# Patient Record
Sex: FEMALE | NOT HISPANIC OR LATINO | Employment: PART TIME | URBAN - METROPOLITAN AREA
[De-identification: names, ages, dates, MRNs, and addresses within clinical notes are randomized per-mention and may not be internally consistent; named-entity substitution may affect disease eponyms.]

---

## 2021-01-22 ENCOUNTER — OFFICE VISIT (OUTPATIENT)
Dept: URGENT CARE | Facility: CLINIC | Age: 41
End: 2021-01-22
Payer: COMMERCIAL

## 2021-01-22 VITALS
SYSTOLIC BLOOD PRESSURE: 110 MMHG | DIASTOLIC BLOOD PRESSURE: 68 MMHG | HEIGHT: 66 IN | TEMPERATURE: 98 F | HEART RATE: 66 BPM | BODY MASS INDEX: 21.73 KG/M2 | OXYGEN SATURATION: 99 % | RESPIRATION RATE: 16 BRPM | WEIGHT: 135.2 LBS

## 2021-01-22 DIAGNOSIS — H66.001 NON-RECURRENT ACUTE SUPPURATIVE OTITIS MEDIA OF RIGHT EAR WITHOUT SPONTANEOUS RUPTURE OF TYMPANIC MEMBRANE: Primary | ICD-10-CM

## 2021-01-22 PROBLEM — H66.91 OTITIS MEDIA OF RIGHT EAR: Status: ACTIVE | Noted: 2021-01-22

## 2021-01-22 PROCEDURE — 99213 OFFICE O/P EST LOW 20 MIN: CPT | Performed by: FAMILY MEDICINE

## 2021-01-22 RX ORDER — AZITHROMYCIN 250 MG/1
TABLET, FILM COATED ORAL
Qty: 6 TABLET | Refills: 0 | Status: SHIPPED | OUTPATIENT
Start: 2021-01-22 | End: 2021-01-26

## 2021-01-22 NOTE — PROGRESS NOTES
[facility]        NAME: Stephanie Morris is a 39 y o  female  : 1980    MRN: [id number]  DATE: 2021  TIME: 12:54 PM    Assessment and Plan   Non-recurrent acute suppurative otitis media of right ear without spontaneous rupture of tympanic membrane [H66 001]  1  Non-recurrent acute suppurative otitis media of right ear without spontaneous rupture of tympanic membrane  azithromycin (ZITHROMAX) 250 mg tablet         Patient Instructions       Follow up with PCP in 3-5 days  Proceed to  ER if symptoms worsen  Chief Complaint     Chief Complaint   Patient presents with    Earache     onset two weeks right ear ache  Tried allegra and flonase without relief  Monday started throbbing  History of Present Illness         51-year-old female with a 2 week history of right ear throbbing  Patient reports trying to use over-the-counter ear drops with no relief  Throbbing and achy sensation continues to persist   Denies any fevers or chills  Denies any nausea, vomiting, headaches or coughs  Review of Systems   Review of Systems   Constitutional: Negative  HENT: Positive for ear pain  Negative for ear discharge  Eyes: Negative  Respiratory: Negative  Cardiovascular: Negative  Gastrointestinal: Negative  Genitourinary: Negative  Musculoskeletal: Positive for arthralgias and myalgias  Skin: Negative  Allergic/Immunologic: Negative  Neurological: Negative  Hematological: Negative  Psychiatric/Behavioral: Negative            Current Medications       Current Outpatient Medications:     azithromycin (ZITHROMAX) 250 mg tablet, Take 2 tablets today then 1 tablet daily x 4 days, Disp: 6 tablet, Rfl: 0    Current Allergies     Allergies as of 2021    (No Known Allergies)            The following portions of the patient's history were reviewed and updated as appropriate: allergies, current medications, past family history, past medical history, past social history, past surgical history and problem list      Past Medical History:   Diagnosis Date    Scoliosis        History reviewed  No pertinent surgical history  History reviewed  No pertinent family history  Medications have been verified  Objective   /68   Pulse 66   Temp 98 °F (36 7 °C) (Temporal)   Resp 16   Ht 5' 6" (1 676 m)   Wt 61 3 kg (135 lb 3 2 oz)   SpO2 99%   BMI 21 82 kg/m²   No LMP recorded  Physical Exam     Physical Exam  Vitals signs and nursing note reviewed  Constitutional:       Appearance: She is well-developed  HENT:      Head: Normocephalic  Right Ear: Tenderness present  Tympanic membrane is injected, erythematous and bulging  Eyes:      Pupils: Pupils are equal, round, and reactive to light  Pulmonary:      Effort: Pulmonary effort is normal    Musculoskeletal: Normal range of motion  Skin:     General: Skin is warm and dry  Neurological:      Mental Status: She is alert and oriented to person, place, and time

## 2023-05-02 ENCOUNTER — OFFICE VISIT (OUTPATIENT)
Dept: DERMATOLOGY | Facility: CLINIC | Age: 43
End: 2023-05-02

## 2023-05-02 VITALS — TEMPERATURE: 97.6 F | BODY MASS INDEX: 21.69 KG/M2 | WEIGHT: 135 LBS | HEIGHT: 66 IN

## 2023-05-02 DIAGNOSIS — L71.0 PERIORIFICIAL DERMATITIS: Primary | ICD-10-CM

## 2023-05-02 DIAGNOSIS — L71.9 ROSACEA: ICD-10-CM

## 2023-05-02 RX ORDER — DOXYCYCLINE HYCLATE 20 MG
20 TABLET ORAL 2 TIMES DAILY
Qty: 60 TABLET | Refills: 0 | Status: SHIPPED | OUTPATIENT
Start: 2023-05-02 | End: 2023-06-01

## 2023-05-02 RX ORDER — METRONIDAZOLE 10 MG/G
GEL TOPICAL DAILY
Qty: 45 G | Refills: 6 | Status: SHIPPED | OUTPATIENT
Start: 2023-05-02

## 2023-05-02 NOTE — PATIENT INSTRUCTIONS
1  PERIORIFICIAL DERMATITIS/ ROSECEA      Assessment and Plan:  Based on a thorough discussion of this condition and the management approach to it (including a comprehensive discussion of the known risks, side effects and potential benefits of treatment), the patient (family) agrees to implement the following specific plan:  Begin Doxycycline 20 mg twice a day with food and water  Begin Metrogel daily    What is periorificial dermatitis? Periorificial dermatitis is a common facial skin problem characterized by groups of itchy or tender small red bumps  It is given this name because the bumps occur around the eyes, the nostrils, the mouth and occasionally, the genitals  The more restrictive term, perioral dermatitis, is often used when the eruption is confined to the skin in the lower half of the face, particularly around the mouth  Periocular dermatitis may be used to describe the rash affecting the eyelids  Periorificial dermatitis mainly affects adult women aged 13 to 39 years  It is less common in men  It can also affect children of any age  What is the cause of periorificial or periorificial dermatitis? The exact cause of periorificial is not understood  Periorificial dermatitis may be related to:  Skin barrier dysfunction  Activation of the body's immune system  Altered bacterial levels on the skin  Bacteria from hair follicles    Unlike seborrheic dermatitis which can affect similar areas of the face, malassezia yeasts are not involved in periorificial dermatitis  Periorificial dermatitis may be directly caused by: Topical steroids, whether applied deliberately to facial skin or inadvertently  Nasal steroids, steroid inhalers, and oral steroids  Cosmetic creams, make-ups and sunscreens  Fluorinated toothpaste  Neglecting to wash the face  Hormonal changes and/or oral contraceptives    What are the symptoms of periorificial dermatitis?   The characteristics of facial periorificial dermatitis are:  Eruption on the chin, upper lip and eyelids   Sparing of the skin bordering the lips (which then appears pale), eyelids, nostrils  Clusters of 1-2 mm red bumps, potentially with pus  Dry and flaky skin surface  Burning irritation    In contrast to steroid-induced rosacea, periorificial dermatitis spares the cheeks and forehead  Genital periorificial dermatitis has a similar clinical appearance  It involves the skin on and around labia majora (in females), scrotum (in males) and anus  Granulomatous periorificial dermatitis is a variant of periorificial dermatitis that presents with persistent yellowish bumps  It occurs mainly in young children and nearly always follows the use of a corticosteroid  Rebound flare of severe periorificial dermatitis may occur after abrupt cessation of application of potent topical steroid to facial skin  The presentation of periorificial dermatitis is usually typical, so diagnosis can be made by history and skin exam  There are no specific tests  Skin biopsy may occasionally be taken, and show similar findings to rosacea  Periorificial dermatitis responds well to treatment, although it may take several weeks before there is a noticeable improvement  General measures  Discontinue applying all face creams including topical steroids, cosmetics and sunscreens (zero therapy)  Consider a slower withdrawal from topical steroid/face creams if there is a severe flare after steroid cessation  Temporarily, replace it by a less potent or less occlusive cream or apply it less and less frequently until it is no longer required  Wash the face with warm water alone while the rash is present  When it has cleared up, use a non-soap bar or liquid cleanser if you wish  Choose a liquid or gel sunscreen  Topical therapy: used to treat mild periorificial dermatitis   Choices include:  Erythromycin  Clindamycin  Metronidazole  Pimecrolimus  Azelaic acid    Oral therapy: in more severe cases, a course of oral antibiotics may be prescribed for 6-12 weeks  Most often, a tetracycline such as doxycycline is recommended  A sub-antimicrobial dose may be sufficient  Oral erythromycin is used during pregnancy and in pre-pubertal children  Oral low-dose isotretinoin may be used if antibiotics are ineffective or contraindicated  Periorificial dermatitis can generally be prevented by the avoidance of topical steroids and occlusive face creams  When topical steroids are necessary to treat an inflammatory facial rash, they should be applied accurately to the affected area, no more than once daily in the lowest effective potency, and discontinued as soon as the rash responds  Periorificial dermatitis sometimes recurs when the antibiotics are discontinued, or at a later date  The same treatment can be used again

## 2023-05-02 NOTE — PROGRESS NOTES
"[facility] Note     Patient Name: Sunny Regalado  Encounter Date: 5/2/2023     Have you been cared for by a [facility] in the last 3 years and, if so, which description applies to you? NO  I am considered a \"new\" patient and must complete all patient intake questions  I am FEMALE/of child-bearing potential     REVIEW OF SYSTEMS:  Have you recently had or currently have any of the following? · Recent fever or chills? No  · Any non-healing wound? No  · Are you pregnant or planning to become pregnant? No  · Are you currently or planning to be nursing or breast feeding? No   PAST MEDICAL HISTORY:  Have you personally ever had or currently have any of the following? If \"YES,\" then please provide more detail  · Skin cancer (such as Melanoma, Basal Cell Carcinoma, Squamous Cell Carcinoma? No  · Tuberculosis, HIV/AIDS, Hepatitis B or C: No  · Systemic Immunosuppression such as Diabetes, Biologic or Immunotherapy, Chemotherapy, Organ Transplantation, Bone Marrow Transplantation No  · Radiation Treatment No   FAMILY HISTORY:  Any \"first degree relatives\" (parent, brother, sister, or child) with the following?  Skin Cancer, Pancreatic or Other Cancer? No   PATIENT EXPERIENCE:     Do you want the Dermatologist to perform a COMPLETE skin exam today including a clinical examination under the \"bra and underwear\" areas? NO   If necessary, do we have your permission to call and leave a detailed message on your Preferred Phone number that includes your specific medical information? Yes      No Known Allergies No current outpatient medications on file   Whom besides the patient is providing clinical information about today's encounter?   o NO ADDITIONAL HISTORIAN (patient alone provided history)    Physical Exam and Assessment/Plan by Diagnosis:    1   PERIORIFICIAL DERMATITIS/ ROSECEA    Physical Exam:   Anatomic Location Affected: Central face     Morphological " Description:  Pink papules      Additional History of Present Condition:  Presents for over 1 month  Reports itch and painful  Assessment and Plan:  Based on a thorough discussion of this condition and the management approach to it (including a comprehensive discussion of the known risks, side effects and potential benefits of treatment), the patient (family) agrees to implement the following specific plan:  · Begin Doxycycline 20 mg twice a day with food and water  · Begin Metrogel daily    What is periorificial dermatitis? Periorificial dermatitis is a common facial skin problem characterized by groups of itchy or tender small red bumps  It is given this name because the bumps occur around the eyes, the nostrils, the mouth and occasionally, the genitals  The more restrictive term, perioral dermatitis, is often used when the eruption is confined to the skin in the lower half of the face, particularly around the mouth  Periocular dermatitis may be used to describe the rash affecting the eyelids  Periorificial dermatitis mainly affects adult women aged 13 to 39 years  It is less common in men  It can also affect children of any age  What is the cause of periorificial or periorificial dermatitis? The exact cause of periorificial is not understood  Periorificial dermatitis may be related to:   Skin barrier dysfunction   Activation of the body's immune system   Altered bacterial levels on the skin   Bacteria from hair follicles    Unlike seborrheic dermatitis which can affect similar areas of the face, malassezia yeasts are not involved in periorificial dermatitis    Periorificial dermatitis may be directly caused by:   Topical steroids, whether applied deliberately to facial skin or inadvertently   Nasal steroids, steroid inhalers, and oral steroids   Cosmetic creams, make-ups and sunscreens   Fluorinated toothpaste   Neglecting to wash the face   Hormonal changes and/or oral contraceptives    What are the symptoms of periorificial dermatitis? The characteristics of facial periorificial dermatitis are:   Eruption on the chin, upper lip and eyelids    Sparing of the skin bordering the lips (which then appears pale), eyelids, nostrils   Clusters of 1-2 mm red bumps, potentially with pus   Dry and flaky skin surface   Burning irritation    In contrast to steroid-induced rosacea, periorificial dermatitis spares the cheeks and forehead  Genital periorificial dermatitis has a similar clinical appearance  It involves the skin on and around labia majora (in females), scrotum (in males) and anus  Granulomatous periorificial dermatitis is a variant of periorificial dermatitis that presents with persistent yellowish bumps  It occurs mainly in young children and nearly always follows the use of a corticosteroid  Rebound flare of severe periorificial dermatitis may occur after abrupt cessation of application of potent topical steroid to facial skin  The presentation of periorificial dermatitis is usually typical, so diagnosis can be made by history and skin exam  There are no specific tests  Skin biopsy may occasionally be taken, and show similar findings to rosacea  Periorificial dermatitis responds well to treatment, although it may take several weeks before there is a noticeable improvement  General measures   Discontinue applying all face creams including topical steroids, cosmetics and sunscreens (zero therapy)   Consider a slower withdrawal from topical steroid/face creams if there is a severe flare after steroid cessation  Temporarily, replace it by a less potent or less occlusive cream or apply it less and less frequently until it is no longer required   Wash the face with warm water alone while the rash is present  When it has cleared up, use a non-soap bar or liquid cleanser if you wish   Choose a liquid or gel sunscreen  Topical therapy: used to treat mild periorificial dermatitis  Choices include:   Erythromycin   Clindamycin   Metronidazole   Pimecrolimus   Azelaic acid    Oral therapy: in more severe cases, a course of oral antibiotics may be prescribed for 6-12 weeks   Most often, a tetracycline such as doxycycline is recommended  A sub-antimicrobial dose may be sufficient   Oral erythromycin is used during pregnancy and in pre-pubertal children   Oral low-dose isotretinoin may be used if antibiotics are ineffective or contraindicated  Periorificial dermatitis can generally be prevented by the avoidance of topical steroids and occlusive face creams  When topical steroids are necessary to treat an inflammatory facial rash, they should be applied accurately to the affected area, no more than once daily in the lowest effective potency, and discontinued as soon as the rash responds  Periorificial dermatitis sometimes recurs when the antibiotics are discontinued, or at a later date  The same treatment can be used again        Scribe Attestation    I,:  Cynyd Baxter am acting as a scribe while in the presence of the attending physician :       I,:  Nyasia Manjarrez MD personally performed the services described in this documentation    as scribed in my presence :

## 2023-07-26 ENCOUNTER — OFFICE VISIT (OUTPATIENT)
Dept: DERMATOLOGY | Facility: CLINIC | Age: 43
End: 2023-07-26
Payer: COMMERCIAL

## 2023-07-26 VITALS — HEIGHT: 66 IN | BODY MASS INDEX: 20.25 KG/M2 | WEIGHT: 126 LBS

## 2023-07-26 DIAGNOSIS — D18.01 CHERRY ANGIOMA: ICD-10-CM

## 2023-07-26 DIAGNOSIS — E75.29 LEUKODYSTROPHY (HCC): ICD-10-CM

## 2023-07-26 DIAGNOSIS — D22.5 MULTIPLE BENIGN NEVI OF UPPER EXTREMITY, LOWER EXTREMITY, AND TRUNK: Primary | ICD-10-CM

## 2023-07-26 DIAGNOSIS — D48.5 NEOPLASM OF UNCERTAIN BEHAVIOR OF SKIN: ICD-10-CM

## 2023-07-26 DIAGNOSIS — D22.70 MULTIPLE BENIGN NEVI OF UPPER EXTREMITY, LOWER EXTREMITY, AND TRUNK: Primary | ICD-10-CM

## 2023-07-26 DIAGNOSIS — D22.60 MULTIPLE BENIGN NEVI OF UPPER EXTREMITY, LOWER EXTREMITY, AND TRUNK: Primary | ICD-10-CM

## 2023-07-26 PROCEDURE — 99213 OFFICE O/P EST LOW 20 MIN: CPT | Performed by: DERMATOLOGY

## 2023-07-26 NOTE — PROGRESS NOTES
[facility] Note     Patient Name: Haseeb Carrillo  Encounter Date: 7/26/2023     Have you been cared for by a [facility] in the last 3 years and, if so, which description applies to you? Yes. I have been here within the last 3 years, and my medical history has NOT changed since that time. I am FEMALE/of child-bearing potential.    REVIEW OF SYSTEMS:  Have you recently had or currently have any of the following? No changes in my recent health. PAST MEDICAL HISTORY:  Have you personally ever had or currently have any of the following? If "YES," then please provide more detail. No changes in my medical history. FAMILY HISTORY:  Any "first degree relatives" (parent, brother, sister, or child) with the following? No changes in my family's known health. PATIENT EXPERIENCE:    Do you want the Dermatologist to perform a COMPLETE skin exam today including a clinical examination under the "bra and underwear" areas? Yes  If necessary, do we have your permission to call and leave a detailed message on your Preferred Phone number that includes your specific medical information? Yes      No Known Allergies   Current Outpatient Medications:   •  metroNIDAZOLE (METROGEL) 1 % gel, Apply topically daily (Patient not taking: Reported on 7/26/2023), Disp: 45 g, Rfl: 6          Whom besides the patient is providing clinical information about today's encounter? NO ADDITIONAL HISTORIAN (patient alone provided history)    Physical Exam and Assessment/Plan by Diagnosis:    1. MELANOCYTIC NEVI ("Moles")    Physical Exam:  Anatomic Location Affected:   Mostly on sun-exposed areas of the trunk and extremities  Morphological Description:  Scattered, 1-4mm round to ovoid, symmetrical-appearing, even bordered, skin colored to dark brown macules/papules, mostly in sun-exposed areas    Assessment and Plan:  Based on a thorough discussion of this condition and the management approach to it (including a comprehensive discussion of the known risks, side effects and potential benefits of treatment), the patient (family) agrees to implement the following specific plan:  When outside we recommend using a wide brim hat, sunglasses, long sleeve and pants, sunscreen with SPF 07+ with reapplication every 2 hours, or SPF specific clothing   Benign, reassured  Annual skin check     Melanocytic Nevi  Melanocytic nevi ("moles") are tan or brown, raised or flat areas of the skin which have an increased number of melanocytes. Melanocytes are the cells in our body which make pigment and account for skin color. Some moles are present at birth (I.e., "congenital nevi"), while others come up later in life (i.e., "acquired nevi"). The sun can stimulate the body to make more moles. Sunburns are not the only thing that triggers more moles. Chronic sun exposure can do it too. Clinically distinguishing a healthy mole from melanoma may be difficult, even for experienced dermatologists. The "ABCDE's" of moles have been suggested as a means of helping to alert a person to a suspicious mole and the possible increased risk of melanoma. The suggestions for raising alert are as follows:    Asymmetry: Healthy moles tend to be symmetric, while melanomas are often asymmetric. Asymmetry means if you draw a line through the mole, the two halves do not match in color, size, shape, or surface texture. Asymmetry can be a result of rapid enlargement of a mole, the development of a raised area on a previously flat lesion, scaling, ulceration, bleeding or scabbing within the mole. Any mole that starts to demonstrate "asymmetry" should be examined promptly by a board certified dermatologist.     Border: Healthy moles tend to have discrete, even borders. The border of a melanoma often blends into the normal skin and does not sharply delineate the mole from normal skin.   Any mole that starts to demonstrate "uneven borders" should be examined promptly by a board certified dermatologist.     Color: Healthy moles tend to be one color throughout. Melanomas tend to be made up of different colors ranging from dark black, blue, white, or red. Any mole that demonstrates a color change should be examined promptly by a board certified dermatologist.     Diameter: Healthy moles tend to be smaller than 0.6 cm in size; an exception are "congenital nevi" that can be larger. Melanomas tend to grow and can often be greater than 0.6 cm (1/4 of an inch, or the size of a pencil eraser). This is only a guideline, and many normal moles may be larger than 0.6 cm without being unhealthy. Any mole that starts to change in size (small to bigger or bigger to smaller) should be examined promptly by a board certified dermatologist.     Evolving: Healthy moles tend to "stay the same."  Melanomas may often show signs of change or evolution such as a change in size, shape, color, or elevation. Any mole that starts to itch, bleed, crust, burn, hurt, or ulcerate or demonstrate a change or evolution should be examined promptly by a board certified dermatologist.      Mary Phillips    Physical Exam:  Anatomic Location Affected:  trunk  Morphological Description:  Scattered cherry red, 1-4 mm papules. Assessment and Plan:  Based on a thorough discussion of this condition and the management approach to it (including a comprehensive discussion of the known risks, side effects and potential benefits of treatment), the patient (family) agrees to implement the following specific plan:  Monitor for changes  Benign, reassured      Assessment and Plan:    Cherry angioma, also known as Pauline Boer spots, are benign vascular skin lesions. A "cherry angioma" is a firm red, blue or purple papule, 0.1-1 cm in diameter. When thrombosed, they can appear black in colour until evaluated with a dermatoscope when the red or purple colour is more easily seen.  Cherry angioma may develop on any part of the body but most often appear on the scalp, face, lips and trunk. An angioma is due to proliferating endothelial cells; these are the cells that line the inside of a blood vessel. Angiomas can arise in early life or later in life; the most common type of angioma is a cherry angioma. Cherry angiomas are very common in males and females of any age or race. They are more noticeable in white skin than in skin of colour. They markedly increase in number from about the age of 36. There may be a family history of similar lesions. Eruptive cherry angiomas have been rarely reported to be associated with internal malignancy. The cause of angiomas is unknown. Genetic analysis of cherry angiomas has shown that they frequently carry specific somatic missense mutations in the GNAQ and GNA11 (Q209H) genes, which are involved in other vascular and melanocytic proliferations. 3. NEOPLASM OF UNCERTAIN BEHAVIOR OF SKIN    Physical Exam:  (Anatomic Location); (Size and Morphological Description); (Differential Diagnosis):  Specimen A; Left webspace between 4th and 5th toes; 0.3 cm angulated pigmented macule; Differential diagnosis: Nevus rule out atypia      Additional History of Present Condition:  Present for 2 years. Patient has history of RSD after foot surgery 2001. Assessment and Plan:  I have discussed with the patient that a sample of skin via a "skin biopsy” would be potentially helpful to further make a specific diagnosis under the microscope. Based on a thorough discussion of this condition and the management approach to it (including a comprehensive discussion of the known risks, side effects and potential benefits of treatment), the patient (family) agrees to implement the following specific plan:    Scheduled excision 10/17/2023 at 3:10 with Dr. Kaylyn Grey in [facility]  Note patient has history of RSD.  She will discuss with RSD provider and proceed with cautious treatment as atraumatically as possible. 4. LEUKODYSTROPHY  Secondary to nail pollish    Physical Exam:  Anatomic Location Affected: All toenails  Morphological Description:  White discoloration of distal half    Additional History of Present Condition:  Present for within last year.     Assessment and Plan:  Based on a thorough discussion of this condition and the management approach to it (including a comprehensive discussion of the known risks, side effects and potential benefits of treatment), the patient (family) agrees to implement the following specific plan:  Recommend keeping nail polish off and nails should grow out normal  Recommend using antifungal cream daily    Scribe Attestation    I,:  Pankaj Cazares MA am acting as a scribe while in the presence of the attending physician.:       I,:  Leonie Hillman MD personally performed the services described in this documentation    as scribed in my presence.:

## 2023-07-26 NOTE — PATIENT INSTRUCTIONS
1.MELANOCYTIC NEVI ("Moles")    Physical Exam:  Anatomic Location Affected:   Mostly on sun-exposed areas of the trunk and extremities  Morphological Description:  Scattered, 1-4mm round to ovoid, symmetrical-appearing, even bordered, skin colored to dark brown macules/papules, mostly in sun-exposed areas    Assessment and Plan:  Based on a thorough discussion of this condition and the management approach to it (including a comprehensive discussion of the known risks, side effects and potential benefits of treatment), the patient (family) agrees to implement the following specific plan:  When outside we recommend using a wide brim hat, sunglasses, long sleeve and pants, sunscreen with SPF 34+ with reapplication every 2 hours, or SPF specific clothing   Benign, reassured  Annual skin check     Melanocytic Nevi  Melanocytic nevi ("moles") are tan or brown, raised or flat areas of the skin which have an increased number of melanocytes. Melanocytes are the cells in our body which make pigment and account for skin color. Some moles are present at birth (I.e., "congenital nevi"), while others come up later in life (i.e., "acquired nevi"). The sun can stimulate the body to make more moles. Sunburns are not the only thing that triggers more moles. Chronic sun exposure can do it too. Clinically distinguishing a healthy mole from melanoma may be difficult, even for experienced dermatologists. The "ABCDE's" of moles have been suggested as a means of helping to alert a person to a suspicious mole and the possible increased risk of melanoma. The suggestions for raising alert are as follows:    Asymmetry: Healthy moles tend to be symmetric, while melanomas are often asymmetric. Asymmetry means if you draw a line through the mole, the two halves do not match in color, size, shape, or surface texture.  Asymmetry can be a result of rapid enlargement of a mole, the development of a raised area on a previously flat lesion, scaling, ulceration, bleeding or scabbing within the mole. Any mole that starts to demonstrate "asymmetry" should be examined promptly by a board certified dermatologist.     Border: Healthy moles tend to have discrete, even borders. The border of a melanoma often blends into the normal skin and does not sharply delineate the mole from normal skin. Any mole that starts to demonstrate "uneven borders" should be examined promptly by a board certified dermatologist.     Color: Healthy moles tend to be one color throughout. Melanomas tend to be made up of different colors ranging from dark black, blue, white, or red. Any mole that demonstrates a color change should be examined promptly by a board certified dermatologist.     Diameter: Healthy moles tend to be smaller than 0.6 cm in size; an exception are "congenital nevi" that can be larger. Melanomas tend to grow and can often be greater than 0.6 cm (1/4 of an inch, or the size of a pencil eraser). This is only a guideline, and many normal moles may be larger than 0.6 cm without being unhealthy. Any mole that starts to change in size (small to bigger or bigger to smaller) should be examined promptly by a board certified dermatologist.     Evolving: Healthy moles tend to "stay the same."  Melanomas may often show signs of change or evolution such as a change in size, shape, color, or elevation. Any mole that starts to itch, bleed, crust, burn, hurt, or ulcerate or demonstrate a change or evolution should be examined promptly by a board certified dermatologist.      Karli Penn    Physical Exam:  Anatomic Location Affected:  trunk  Morphological Description:  Scattered cherry red, 1-4 mm papules.     Assessment and Plan:  Based on a thorough discussion of this condition and the management approach to it (including a comprehensive discussion of the known risks, side effects and potential benefits of treatment), the patient (family) agrees to implement the following specific plan:  Monitor for changes  Benign, reassured      Assessment and Plan:    Cherry angioma, also known as Evonnie Bears spots, are benign vascular skin lesions. A "cherry angioma" is a firm red, blue or purple papule, 0.1-1 cm in diameter. When thrombosed, they can appear black in colour until evaluated with a dermatoscope when the red or purple colour is more easily seen. Cherry angioma may develop on any part of the body but most often appear on the scalp, face, lips and trunk. An angioma is due to proliferating endothelial cells; these are the cells that line the inside of a blood vessel. Angiomas can arise in early life or later in life; the most common type of angioma is a cherry angioma. Cherry angiomas are very common in males and females of any age or race. They are more noticeable in white skin than in skin of colour. They markedly increase in number from about the age of 36. There may be a family history of similar lesions. Eruptive cherry angiomas have been rarely reported to be associated with internal malignancy. The cause of angiomas is unknown. Genetic analysis of cherry angiomas has shown that they frequently carry specific somatic missense mutations in the GNAQ and GNA11 (Q209H) genes, which are involved in other vascular and melanocytic proliferations. 3. NEOPLASM OF UNCERTAIN BEHAVIOR OF SKIN    Physical Exam:  (Anatomic Location); (Size and Morphological Description); (Differential Diagnosis):  Specimen A; Left webspace between 4th and 5th toes; 0.3 cm angulated pigmented macule; Differential diagnosis: Nevus rule out atypia    Assessment and Plan:  I have discussed with the patient that a sample of skin via a "skin biopsy” would be potentially helpful to further make a specific diagnosis under the microscope.   Based on a thorough discussion of this condition and the management approach to it (including a comprehensive discussion of the known risks, side effects and potential benefits of treatment), the patient (family) agrees to implement the following specific plan:    Scheduled excision 10/17/2023 at 3:10 with Dr. Haven Salas in [facility]    4. LEUKODYSTROPHY    Physical Exam:  Anatomic Location Affected:   All toenails      Assessment and Plan:  Based on a thorough discussion of this condition and the management approach to it (including a comprehensive discussion of the known risks, side effects and potential benefits of treatment), the patient (family) agrees to implement the following specific plan:  Recommend keeping nail polish off and nails should grow out normal  Recommend using antifungal cream daily

## 2023-09-27 ENCOUNTER — OFFICE VISIT (OUTPATIENT)
Dept: GYNECOLOGY | Facility: CLINIC | Age: 43
End: 2023-09-27
Payer: COMMERCIAL

## 2023-09-27 VITALS
WEIGHT: 127.8 LBS | SYSTOLIC BLOOD PRESSURE: 104 MMHG | BODY MASS INDEX: 21.29 KG/M2 | DIASTOLIC BLOOD PRESSURE: 70 MMHG | HEIGHT: 65 IN

## 2023-09-27 DIAGNOSIS — Z12.31 ENCOUNTER FOR SCREENING MAMMOGRAM FOR BREAST CANCER: ICD-10-CM

## 2023-09-27 DIAGNOSIS — N92.6 IRREGULAR MENSES: ICD-10-CM

## 2023-09-27 DIAGNOSIS — N60.11 FIBROCYSTIC BREAST CHANGES, BILATERAL: ICD-10-CM

## 2023-09-27 DIAGNOSIS — Z11.51 SCREENING FOR HUMAN PAPILLOMAVIRUS (HPV): ICD-10-CM

## 2023-09-27 DIAGNOSIS — N64.4 BREAST PAIN, RIGHT: ICD-10-CM

## 2023-09-27 DIAGNOSIS — Z01.419 WOMEN'S ANNUAL ROUTINE GYNECOLOGICAL EXAMINATION: Primary | ICD-10-CM

## 2023-09-27 DIAGNOSIS — N60.12 FIBROCYSTIC BREAST CHANGES, BILATERAL: ICD-10-CM

## 2023-09-27 PROBLEM — Q51.28 UTERINE SEPTUM: Status: ACTIVE | Noted: 2023-09-27

## 2023-09-27 PROCEDURE — G0476 HPV COMBO ASSAY CA SCREEN: HCPCS | Performed by: OBSTETRICS & GYNECOLOGY

## 2023-09-27 PROCEDURE — G0145 SCR C/V CYTO,THINLAYER,RESCR: HCPCS | Performed by: OBSTETRICS & GYNECOLOGY

## 2023-09-27 PROCEDURE — S0610 ANNUAL GYNECOLOGICAL EXAMINA: HCPCS | Performed by: OBSTETRICS & GYNECOLOGY

## 2023-09-27 NOTE — PROGRESS NOTES
Assessment/Plan   Diagnoses and all orders for this visit:    Women's annual routine gynecological examination    Encounter for screening mammogram for breast cancer  -     Cancel: Mammo screening bilateral w 3d & cad; Future    Irregular menses  -     hCG, quantitative; Future  -     CBC; Future  -     TSH, 3rd generation; Future    Breast pain, right  -     Mammo diagnostic bilateral w 3d & cad; Future  -     US breast right limited (diagnostic); Future    Fibrocystic breast changes, bilateral    1. new patient yearly exam-Pap smear done with HPV testing, self breast awareness reviewed, calcium/vitamin D recommendations discussed, mammogram request given,  2. right breast discomfort-this has been present for a number of years. Did see some documentation in the EMR in 2018 with mastodynia. Did have imaging at [facility] couple years ago with no specific findings by her report. Exam is notable for fibrocystic changes noted bilaterally, particularly in the lower quadrants. Her tenderness is noted in the lower inner quadrant of the right breast and the lower midline quadrant under the nipple. No focal masses or warmth or erythema or discharge are appreciated. She does use sports bras now, avoid underwire bra. Suggested ibuprofen or Tylenol as needed. Did discuss evening primrose oil. Given the persistence of symptoms and her significant discomfort anytime anything touches the breast, we will proceed with evaluation. Bilateral diagnostic mammogram ordered along with right breast ultrasound. Follow-up with breast check at follow-up visit. Could consider referral to breast specialist if symptoms persist.  3.  Irregular bleeding- typically menses are 24 to 30-day intervals lasting 4 to 5 days, bleeding stops for 4 to 5 days and then again for 1 to 2 days time. Over the last 6+ weeks, she has been getting bleeding every 2 weeks which is new for her. She denies pregnancy. Exam is unremarkable.   Would recommend evaluation. Laboratory sheet given for CBC, TSH, and hCG. To return near future for sonohysterogram with endometrial biopsy. Counseled in detail about the procedure, encouraged to take ibuprofen prior to it. 4. history of uterine septum-status post resection prior to her children. We will assess at sonohysterogram.  5. history uterine fibroid-was noted during fertility treatments. She was told it was small, not affecting the endometrium. We will assess at ultrasound/sonohysterogram.  6.  History of ovarian cyst-noted during fertility treatment. She denies any recent symptoms. To assess at ultrasound. 7. history of  delivery x2-first pregnancy conceived with IUI, rupture membranes 30 weeks,  delivery at 32 weeks via . Second pregnancy conceived with holistic doctor,  findings noted at 21 weeks treated with vaginal progesterone ultimately delivered with repeat  at 42 weeks. 8. other-has multiple allergies. Takes betaine for decrease acid in the stomach with much improvement, also allergy shots    Subjective   Patient ID: Sherri Aquino is a 37 y.o. female. Vitals:    23 1402   BP: 104/70     Patient was seen today for new patient yearly exam.  Please see assessment plan for details.       The following portions of the patient's history were reviewed and updated as appropriate: allergies, current medications, past family history, past medical history, past social history, past surgical history and problem list.  Past Medical History:   Diagnosis Date   • Scoliosis      Past Surgical History:   Procedure Laterality Date   •  SECTION     • FOOT NEUROMA SURGERY Left      OB History    Para Term  AB Living   3 2   2   2   SAB IAB Ectopic Multiple Live Births                  # Outcome Date GA Lbr Ernesto/2nd Weight Sex Delivery Anes PTL Lv   3             2             1                 Current Outpatient Medications:   •  metroNIDAZOLE (METROGEL) 1 % gel, Apply topically daily (Patient not taking: Reported on 7/26/2023), Disp: 45 g, Rfl: 6  Allergies   Allergen Reactions   • Seasonal Ic [Octacosanol] Fatigue     Social History     Socioeconomic History   • Marital status: /Civil Union     Spouse name: None   • Number of children: None   • Years of education: None   • Highest education level: None   Occupational History   • None   Tobacco Use   • Smoking status: Former   • Smokeless tobacco: Never   Vaping Use   • Vaping Use: Never used   Substance and Sexual Activity   • Alcohol use: Yes     Comment: social   • Drug use: Never   • Sexual activity: Yes     Partners: Male   Other Topics Concern   • None   Social History Narrative   • None     Social Determinants of Health     Financial Resource Strain: Not on file   Food Insecurity: Not on file   Transportation Needs: Not on file   Physical Activity: Not on file   Stress: Not on file   Social Connections: Not on file   Intimate Partner Violence: Not on file   Housing Stability: Not on file     Family History   Problem Relation Age of Onset   • Stroke Mother    • Hypertension Mother    • Heart disease Mother    • Hyperlipidemia Father    • Hypertension Father    • No Known Problems Sister    • No Known Problems Brother    • Cancer Maternal Grandfather    • Other Paternal Grandmother    • Ovarian cancer Paternal Aunt        Review of Systems   Constitutional: Negative for chills, diaphoresis, fatigue and fever. Respiratory: Negative for apnea, cough, chest tightness, shortness of breath and wheezing. Cardiovascular: Negative for chest pain, palpitations and leg swelling. Gastrointestinal: Negative for abdominal distention, abdominal pain, anal bleeding, constipation, diarrhea, nausea, rectal pain and vomiting.    Genitourinary: Negative for difficulty urinating, dyspareunia, dysuria, frequency, hematuria, menstrual problem, pelvic pain, urgency, vaginal bleeding, vaginal discharge and vaginal pain. Musculoskeletal: Negative for arthralgias, back pain and myalgias. Skin: Negative for color change and rash. Neurological: Negative for dizziness, syncope, light-headedness, numbness and headaches. Hematological: Negative for adenopathy. Does not bruise/bleed easily. Psychiatric/Behavioral: Negative for dysphoric mood and sleep disturbance. The patient is not nervous/anxious. Objective   Physical Exam  OBGyn Exam     Objective      /70 (BP Location: Right arm, Patient Position: Sitting)   Ht 5' 5" (1.651 m)   Wt 58 kg (127 lb 12.8 oz)   LMP 09/22/2023   BMI 21.27 kg/m²     General:   alert and oriented, in no acute distress   Neck: normal to inspection and palpation   Breast: normal appearance, no masses or tenderness, tenderness noted in the lower inner quadrant of the right breast and inferior to the nipple at 6:00. Fibrocystic changes noted, symmetric bilaterally without any dominant masses or erythema or warmth or tenderness or discharge   Heart:    Lungs:    Abdomen: soft, non-tender, without masses or organomegaly   Vulva: normal   Vagina: Without erythema or lesions or discharge. Normal   Cervix: Without lesions or discharge or cervicitis.   No Cervical motion tenderness   Uterus: top normal size, anteverted, non-tender   Adnexa: no mass, fullness, tenderness   Rectum: negative    Psych:  Normal mood and affect   Skin:  Without obvious lesions   Eyes: symmetric, with normal movements and reactivity   Musculoskeletal:  Normal muscle tone and movements appreciated

## 2023-09-28 LAB
HPV HR 12 DNA CVX QL NAA+PROBE: NEGATIVE
HPV16 DNA CVX QL NAA+PROBE: NEGATIVE
HPV18 DNA CVX QL NAA+PROBE: NEGATIVE

## 2023-09-29 ENCOUNTER — LAB (OUTPATIENT)
Dept: LAB | Facility: HOSPITAL | Age: 43
End: 2023-09-29
Payer: COMMERCIAL

## 2023-09-29 DIAGNOSIS — N92.6 IRREGULAR MENSES: ICD-10-CM

## 2023-09-29 LAB
B-HCG SERPL-ACNC: <1 MIU/ML (ref 0–5)
ERYTHROCYTE [DISTWIDTH] IN BLOOD BY AUTOMATED COUNT: 12.7 % (ref 11.6–15.1)
HCT VFR BLD AUTO: 42.1 % (ref 34.8–46.1)
HGB BLD-MCNC: 14 G/DL (ref 11.5–15.4)
MCH RBC QN AUTO: 30.9 PG (ref 26.8–34.3)
MCHC RBC AUTO-ENTMCNC: 33.3 G/DL (ref 31.4–37.4)
MCV RBC AUTO: 93 FL (ref 82–98)
PLATELET # BLD AUTO: 237 THOUSANDS/UL (ref 149–390)
PMV BLD AUTO: 10.9 FL (ref 8.9–12.7)
RBC # BLD AUTO: 4.53 MILLION/UL (ref 3.81–5.12)
TSH SERPL DL<=0.05 MIU/L-ACNC: 1.49 UIU/ML (ref 0.45–4.5)
WBC # BLD AUTO: 5.49 THOUSAND/UL (ref 4.31–10.16)

## 2023-09-29 PROCEDURE — 85027 COMPLETE CBC AUTOMATED: CPT

## 2023-09-29 PROCEDURE — 36415 COLL VENOUS BLD VENIPUNCTURE: CPT

## 2023-09-29 PROCEDURE — 84443 ASSAY THYROID STIM HORMONE: CPT

## 2023-09-29 PROCEDURE — 84702 CHORIONIC GONADOTROPIN TEST: CPT

## 2023-09-29 NOTE — RESULT ENCOUNTER NOTE
TSH, CBC, hCG are all normal/negative. Patient to follow-up near future for sonohysterogram with endometrial biopsy or as needed.

## 2023-10-04 LAB
LAB AP GYN PRIMARY INTERPRETATION: NORMAL
LAB AP LMP: NORMAL
Lab: NORMAL

## 2023-10-16 ENCOUNTER — HOSPITAL ENCOUNTER (OUTPATIENT)
Dept: ULTRASOUND IMAGING | Facility: CLINIC | Age: 43
Discharge: HOME | End: 2023-10-16
Payer: COMMERCIAL

## 2023-10-16 ENCOUNTER — HOSPITAL ENCOUNTER (OUTPATIENT)
Dept: MAMMOGRAPHY | Facility: CLINIC | Age: 43
Discharge: HOME | End: 2023-10-16
Payer: COMMERCIAL

## 2023-10-16 VITALS — WEIGHT: 127 LBS | HEIGHT: 66 IN | BODY MASS INDEX: 20.41 KG/M2

## 2023-10-16 DIAGNOSIS — N64.4 BREAST PAIN, RIGHT: ICD-10-CM

## 2023-10-16 PROCEDURE — 77066 DX MAMMO INCL CAD BI: CPT

## 2023-10-16 PROCEDURE — G0279 TOMOSYNTHESIS, MAMMO: HCPCS

## 2023-10-16 PROCEDURE — 76642 ULTRASOUND BREAST LIMITED: CPT

## 2023-10-31 ENCOUNTER — PROCEDURE VISIT (OUTPATIENT)
Dept: GYNECOLOGY | Facility: CLINIC | Age: 43
End: 2023-10-31
Payer: COMMERCIAL

## 2023-10-31 ENCOUNTER — ULTRASOUND (OUTPATIENT)
Dept: GYNECOLOGY | Facility: CLINIC | Age: 43
End: 2023-10-31
Payer: COMMERCIAL

## 2023-10-31 DIAGNOSIS — N60.11 FIBROCYSTIC BREAST CHANGES, BILATERAL: ICD-10-CM

## 2023-10-31 DIAGNOSIS — N92.6 IRREGULAR MENSES: ICD-10-CM

## 2023-10-31 DIAGNOSIS — N64.4 BREAST PAIN, RIGHT: Primary | ICD-10-CM

## 2023-10-31 DIAGNOSIS — N93.9 ABNORMAL UTERINE BLEEDING (AUB): Primary | ICD-10-CM

## 2023-10-31 DIAGNOSIS — N60.12 FIBROCYSTIC BREAST CHANGES, BILATERAL: ICD-10-CM

## 2023-10-31 LAB — SL AMB POCT URINE HCG: NORMAL

## 2023-10-31 PROCEDURE — 88305 TISSUE EXAM BY PATHOLOGIST: CPT | Performed by: SPECIALIST

## 2023-10-31 PROCEDURE — 76830 TRANSVAGINAL US NON-OB: CPT | Performed by: OBSTETRICS & GYNECOLOGY

## 2023-10-31 PROCEDURE — 81025 URINE PREGNANCY TEST: CPT | Performed by: OBSTETRICS & GYNECOLOGY

## 2023-10-31 PROCEDURE — 76831 ECHO EXAM UTERUS: CPT | Performed by: OBSTETRICS & GYNECOLOGY

## 2023-10-31 PROCEDURE — 58340 CATHETER FOR HYSTEROGRAPHY: CPT | Performed by: OBSTETRICS & GYNECOLOGY

## 2023-10-31 PROCEDURE — 99213 OFFICE O/P EST LOW 20 MIN: CPT | Performed by: OBSTETRICS & GYNECOLOGY

## 2023-10-31 PROCEDURE — 58100 BIOPSY OF UTERUS LINING: CPT | Performed by: OBSTETRICS & GYNECOLOGY

## 2023-10-31 NOTE — PROGRESS NOTES
Assessment/Plan   Diagnoses and all orders for this visit:    Breast pain, right    Fibrocystic breast changes, bilateral    Irregular menses    1. irregular menses-now improved. Typically, had menses at 24 to 30-day cycles lasting 4 to 5 days. The past 6 to 8 weeks, had bleeding every 2 weeks or so. Last menses 2023, had 1 day of spotting on 10/8/2023. Since then, has had no bleeding. Had laboratory testing with normal TSH, hCG, CBC. Sonohysterogram today demonstrates no focal findings in the endometrium. Endometrial biopsy was done and we will inform the patient of the findings. Would recommend no intervention at this time. To call return with any menometrorrhagia. 2.  Right breast discomfort-has been present for a number of years. Now improved with use of evening primrose oil. Had bilateral diagnostic mammogram and right breast ultrasound on 10/16/2023 with multiple cysts noted in the right breast.  She has had this history for a long time. Right breast ultrasound is recommended in 6 months time, request was previously sent and this is scheduled. 1 year diagnostic mammogram was also recommended, we will order after the follow-up ultrasound accordingly. 3.  History of uterine septum-status post resection prior to her children. Not definitively seen today on sonohysterogram.  4. history of uterine myoma-7 mm fundal myoma was noted, without submucosal extension. Patient was told similar size myoma in the distant past with fertility treatments. 5. history of ovarian cyst-none noted today. 6. history of  delivery x2-first pregnancy conceived with IUI, rupture membranes at 30 weeks,  delivery at 32 weeks via . Second pregnancy conceived with holistic doctor with  findings noted at 21 weeks, treated with vaginal progesterone until ultimately delivering repeat  at 42 weeks.   Of note, the  incision in the anterior lower uterine segment did extend to near the endometrium in the lower uterine segment. Follow-up 2024 for yearly exam or as needed. Subjective   Patient ID: Jackson Olivas is a 37 y.o. female. There were no vitals filed for this visit. Was seen today for sonohysterogram with endometrial biopsy. Please see assessment plan and procedure note for details.       The following portions of the patient's history were reviewed and updated as appropriate: allergies, current medications, past family history, past medical history, past social history, past surgical history, and problem list.  Past Medical History:   Diagnosis Date    Scoliosis      Past Surgical History:   Procedure Laterality Date     SECTION      x2    FOOT NEUROMA SURGERY Left     HYSTEROSCOPY      With resection of uterine septum     OB History    Para Term  AB Living   3 1 0 1 2 1   SAB IAB Ectopic Multiple Live Births   1   1   2      # Outcome Date GA Lbr Ernesto/2nd Weight Sex Delivery Anes PTL Lv   3 Ectopic     F CS-Unspec   JAYLA      Birth Comments:  at 21 weeks, delivery at 40 weeks   2      M CS-Unspec   JAYLA   1 SAB                Current Outpatient Medications:     metroNIDAZOLE (METROGEL) 1 % gel, Apply topically daily (Patient not taking: Reported on 2023), Disp: 45 g, Rfl: 6  Allergies   Allergen Reactions    Molds & Smuts Fatigue     Social History     Socioeconomic History    Marital status: /Civil Union     Spouse name: Not on file    Number of children: Not on file    Years of education: Not on file    Highest education level: Not on file   Occupational History    Not on file   Tobacco Use    Smoking status: Former    Smokeless tobacco: Never   Vaping Use    Vaping Use: Never used   Substance and Sexual Activity    Alcohol use: Yes     Comment: social    Drug use: Never    Sexual activity: Yes     Partners: Male   Other Topics Concern    Not on file   Social History Narrative    Not on file     Social Determinants of Health     Financial Resource Strain: Not on file   Food Insecurity: Not on file   Transportation Needs: Not on file   Physical Activity: Not on file   Stress: Not on file   Social Connections: Not on file   Intimate Partner Violence: Not on file   Housing Stability: Not on file     Family History   Problem Relation Age of Onset    Stroke Mother     Hypertension Mother     Heart disease Mother     Hyperlipidemia Father     Hypertension Father     No Known Problems Sister     No Known Problems Sister     No Known Problems Sister     No Known Problems Daughter     Cancer Maternal Grandfather     Prostate cancer Maternal Grandfather         65-80    Other Paternal Grandmother     Gallbladder disease Paternal Grandmother         52's    No Known Problems Brother     Ovarian cancer Paternal Aunt        Review of Systems   Constitutional:  Negative for chills, diaphoresis, fatigue and fever. Respiratory:  Negative for apnea, cough, chest tightness, shortness of breath and wheezing. Cardiovascular:  Negative for chest pain, palpitations and leg swelling. Gastrointestinal:  Negative for abdominal distention, abdominal pain, anal bleeding, constipation, diarrhea, nausea, rectal pain and vomiting. Genitourinary:  Positive for vaginal bleeding. Negative for difficulty urinating, dyspareunia, dysuria, frequency, hematuria, menstrual problem, pelvic pain, urgency, vaginal discharge and vaginal pain. Musculoskeletal:  Negative for arthralgias, back pain and myalgias. Skin:  Negative for color change and rash. Neurological:  Negative for dizziness, syncope, light-headedness, numbness and headaches. Hematological:  Negative for adenopathy. Does not bruise/bleed easily. Psychiatric/Behavioral:  Negative for dysphoric mood and sleep disturbance. The patient is not nervous/anxious.         Objective   Physical Exam  OBGyn Exam     Objective      LMP 09/22/2023 (Exact Date)     General: alert and oriented, in no acute distress   Neck: normal to inspection and palpation   Breast: normal appearance, no masses or tenderness   Heart:    Lungs:    Abdomen: soft, non-tender, without masses or organomegaly   Vulva: normal   Vagina: Without erythema or lesions or discharge. Normal   Cervix: Without lesions or discharge or cervicitis.   No Cervical motion tenderness   Uterus: top normal size, anteverted, non-tender   Adnexa: no mass, fullness, tenderness   Rectum: deferred    Psych:  Normal mood and affect   Skin:  Without obvious lesions   Eyes: symmetric, with normal movements and reactivity   Musculoskeletal:  Normal muscle tone and movements appreciated

## 2023-11-06 PROCEDURE — 88305 TISSUE EXAM BY PATHOLOGIST: CPT | Performed by: SPECIALIST

## 2024-02-10 ENCOUNTER — OFFICE VISIT (OUTPATIENT)
Dept: URGENT CARE | Facility: CLINIC | Age: 44
End: 2024-02-10
Payer: COMMERCIAL

## 2024-02-10 VITALS
SYSTOLIC BLOOD PRESSURE: 130 MMHG | HEART RATE: 83 BPM | DIASTOLIC BLOOD PRESSURE: 85 MMHG | TEMPERATURE: 97.7 F | OXYGEN SATURATION: 98 %

## 2024-02-10 DIAGNOSIS — J02.0 STREP PHARYNGITIS: Primary | ICD-10-CM

## 2024-02-10 DIAGNOSIS — R52 GENERALIZED BODY ACHES: ICD-10-CM

## 2024-02-10 DIAGNOSIS — J02.9 SORE THROAT: ICD-10-CM

## 2024-02-10 DIAGNOSIS — R50.9 FEVER, UNSPECIFIED FEVER CAUSE: ICD-10-CM

## 2024-02-10 LAB
S PYO AG THROAT QL: POSITIVE
SARS-COV-2 AG UPPER RESP QL IA: NEGATIVE
VALID CONTROL: NORMAL

## 2024-02-10 PROCEDURE — 87880 STREP A ASSAY W/OPTIC: CPT | Performed by: FAMILY MEDICINE

## 2024-02-10 PROCEDURE — 99213 OFFICE O/P EST LOW 20 MIN: CPT | Performed by: FAMILY MEDICINE

## 2024-02-10 PROCEDURE — 87811 SARS-COV-2 COVID19 W/OPTIC: CPT | Performed by: FAMILY MEDICINE

## 2024-02-10 RX ORDER — AMOXICILLIN 500 MG/1
500 CAPSULE ORAL EVERY 12 HOURS SCHEDULED
Qty: 20 CAPSULE | Refills: 0 | Status: SHIPPED | OUTPATIENT
Start: 2024-02-10 | End: 2024-02-20

## 2024-02-10 RX ORDER — MECLIZINE HYDROCHLORIDE 25 MG/1
25 TABLET ORAL EVERY 8 HOURS PRN
Qty: 30 TABLET | Refills: 0 | Status: SHIPPED | OUTPATIENT
Start: 2024-02-10 | End: 2024-02-20

## 2024-02-10 NOTE — PROGRESS NOTES
[facility]        NAME: Rhonda Carrero is a 44 y.o. female  : 1980    MRN: [id number]  DATE: February 10, 2024  TIME: 9:10 AM    Assessment and Plan   Strep pharyngitis [J02.0]  1. Strep pharyngitis  amoxicillin (AMOXIL) 500 mg capsule    meclizine (ANTIVERT) 25 mg tablet      2. Sore throat  POCT rapid strepA    Poct Covid 19 Rapid Antigen Test      3. Fever, unspecified fever cause  POCT rapid strepA    Poct Covid 19 Rapid Antigen Test      4. Generalized body aches  POCT rapid strepA    Poct Covid 19 Rapid Antigen Test            Patient Instructions       Follow up with PCP in 3-5 days.  Proceed to  ER if symptoms worsen.    Chief Complaint     Chief Complaint   Patient presents with    Sore Throat     Pt reports sore throat, BA's, and fever x1 day. Pt also reports nausea w/o bouts of vomiting.          History of Present Illness       44-year-old female with 1 week history of sore throat and painful swallowing.  She also reports dizziness associate with her vertigo.  Denies fevers or chills.    Sore Throat         Review of Systems   Review of Systems   Constitutional: Negative.    HENT:  Positive for sore throat.    Eyes: Negative.    Respiratory: Negative.     Cardiovascular: Negative.    Gastrointestinal: Negative.    Genitourinary: Negative.    Musculoskeletal: Negative.    Skin: Negative.    Allergic/Immunologic: Negative.    Neurological: Negative.    Hematological: Negative.    Psychiatric/Behavioral: Negative.           Current Medications       Current Outpatient Medications:     amoxicillin (AMOXIL) 500 mg capsule, Take 1 capsule (500 mg total) by mouth every 12 (twelve) hours for 10 days, Disp: 20 capsule, Rfl: 0    meclizine (ANTIVERT) 25 mg tablet, Take 1 tablet (25 mg total) by mouth every 8 (eight) hours as needed for dizziness for up to 10 days, Disp: 30 tablet, Rfl: 0    metroNIDAZOLE (METROGEL) 1 % gel, Apply topically daily (Patient not taking: Reported on 2023), Disp:  45 g, Rfl: 6    Current Allergies     Allergies as of 02/10/2024 - Reviewed 10/16/2023   Allergen Reaction Noted    Molds & smuts Fatigue 2023            The following portions of the patient's history were reviewed and updated as appropriate: allergies, current medications, past family history, past medical history, past social history, past surgical history and problem list.     Past Medical History:   Diagnosis Date    Scoliosis        Past Surgical History:   Procedure Laterality Date     SECTION      x2    FOOT NEUROMA SURGERY Left     HYSTEROSCOPY      With resection of uterine septum       Family History   Problem Relation Age of Onset    Stroke Mother     Hypertension Mother     Heart disease Mother     Hyperlipidemia Father     Hypertension Father     No Known Problems Sister     No Known Problems Sister     No Known Problems Sister     No Known Problems Daughter     Cancer Maternal Grandfather     Prostate cancer Maternal Grandfather         75-80    Other Paternal Grandmother     Gallbladder disease Paternal Grandmother         50's    No Known Problems Brother     Ovarian cancer Paternal Aunt          Medications have been verified.        Objective   /85   Pulse 83   Temp 97.7 °F (36.5 °C)   LMP 2024 (Exact Date)   SpO2 98%   Patient's last menstrual period was 2024 (exact date).       Physical Exam     Physical Exam  Vitals and nursing note reviewed.   Constitutional:       Appearance: She is well-developed.   HENT:      Head: Normocephalic.      Nose: Congestion present.      Mouth/Throat:      Pharynx: Oropharyngeal exudate and posterior oropharyngeal erythema present.   Eyes:      Pupils: Pupils are equal, round, and reactive to light.   Cardiovascular:      Rate and Rhythm: Normal rate.   Pulmonary:      Effort: Pulmonary effort is normal.   Abdominal:      General: Abdomen is flat.   Musculoskeletal:         General: Normal range of motion.      Cervical  back: Normal range of motion.   Skin:     General: Skin is warm and dry.   Neurological:      Mental Status: She is alert and oriented to person, place, and time.

## 2024-04-19 ENCOUNTER — HOSPITAL ENCOUNTER (OUTPATIENT)
Dept: ULTRASOUND IMAGING | Facility: CLINIC | Age: 44
Discharge: HOME | End: 2024-04-19
Payer: COMMERCIAL

## 2024-04-19 DIAGNOSIS — N64.4 BREAST PAIN, RIGHT: ICD-10-CM

## 2024-04-19 DIAGNOSIS — R92.8 FOLLOW-UP EXAMINATION OF ABNORMAL MAMMOGRAM: ICD-10-CM

## 2024-04-19 PROCEDURE — 76642 ULTRASOUND BREAST LIMITED: CPT

## 2024-04-22 ENCOUNTER — TELEPHONE (OUTPATIENT)
Age: 44
End: 2024-04-22

## 2024-04-22 NOTE — TELEPHONE ENCOUNTER
[facility]     Screened by: James Rueda MA    Referring Provider pcp    Pre- Screening:     There is no height or weight on file to calculate BMI.  Has patient been referred for a routine screening Colonoscopy? no  Is the patient between 45-75 years old? no      Previous Colonoscopy no   If yes:    Date:     Facility:     Reason:       Does the patient want to see a Gastroenterologist prior to their procedure OR are they having any GI symptoms? yes - ABD pain    Has the patient been hospitalized or had abdominal surgery in the past 6 months? no    Does the patient use supplemental oxygen? no    Does the patient take Coumadin, Lovenox, Plavix, Elliquis, Xarelto, or other blood thinning medication? no    Has the patient had a stroke, cardiac event, or stent placed in the past year? no    Colon consult scheudled     If patient is between 45yrs - 49yrs, please advise patient that we will have to confirm benefits & coverage with their insurance company for a routine screening colonoscopy.

## 2024-04-26 ENCOUNTER — OFFICE VISIT (OUTPATIENT)
Dept: GASTROENTEROLOGY | Facility: CLINIC | Age: 44
End: 2024-04-26
Payer: COMMERCIAL

## 2024-04-26 VITALS
DIASTOLIC BLOOD PRESSURE: 70 MMHG | HEIGHT: 66 IN | BODY MASS INDEX: 21.69 KG/M2 | SYSTOLIC BLOOD PRESSURE: 118 MMHG | WEIGHT: 135 LBS

## 2024-04-26 DIAGNOSIS — R19.4 CHANGE IN BOWEL HABITS: Primary | ICD-10-CM

## 2024-04-26 DIAGNOSIS — R14.0 BLOATING: ICD-10-CM

## 2024-04-26 DIAGNOSIS — R10.84 GENERALIZED ABDOMINAL PAIN: ICD-10-CM

## 2024-04-26 PROBLEM — H66.91 OTITIS MEDIA OF RIGHT EAR: Status: RESOLVED | Noted: 2021-01-22 | Resolved: 2024-04-26

## 2024-04-26 PROBLEM — J02.0 STREP PHARYNGITIS: Status: RESOLVED | Noted: 2024-02-10 | Resolved: 2024-04-26

## 2024-04-26 PROCEDURE — 99204 OFFICE O/P NEW MOD 45 MIN: CPT | Performed by: PHYSICIAN ASSISTANT

## 2024-04-26 NOTE — PROGRESS NOTES
[facility] - Outpatient Consultation  Rhonda Carrero 44 y.o. female MRN: [id number]  Encounter: [id number]    ASSESSMENT AND PLAN:    1. Change in bowel habits  Chronic, worsening in past 2-3 yrs. Lab workup of CBC, CMP, TSH to date WNL. Fluctuating constipation (baseline) w/ intermittent diarrhea. Some question of intermittent tarry/dark stools  DDx includes IBS, IBD, microscopic colitis, neoplasm  Recommend colonoscopy for further evaluation, patient is agreeable. Procedure risks including perforation, bleeding, infection, missed lesion and preparation discussed in detail. No contraindications to perform procedure at [facility].  Recommend increasing dietary fiber to goal of 25-30 g/day, fiber supplementation (Benefiber) as needed to reach this daily goal. Increase daily fluid/water intake to 2 L (64 oz)  Follow up in 3-4 weeks after colonoscopy  - Colonoscopy; Future  - sodium picosulfate, magnesium oxide, citric acid (Clenpiq) oral solution; Take 175 mL (1 bottle) the evening before the colonoscopy, between 5 PM and 9 PM, followed by a second 175 mL bottle 5 hours before the colonoscopy.  Dispense: 350 mL; Refill: 0    2. Generalized abdominal pain  Chronic, worsening; most likely 2/2 constipation/overflow diarrhea  Plan as above  - Colonoscopy; Future  - sodium picosulfate, magnesium oxide, citric acid (Clenpiq) oral solution; Take 175 mL (1 bottle) the evening before the colonoscopy, between 5 PM and 9 PM, followed by a second 175 mL bottle 5 hours before the colonoscopy.  Dispense: 350 mL; Refill: 0    3. Bloating  Chronic, worsening; most likely 2/2 constipation/overflow diarrhea  Plan as above   If persistent despite appropriate management of constipation, consider SIBO testing    Return for colonoscopy; office visit in 3-4 weeks to follow.    Chief Complaint   Patient presents with    Change in bowels, cramping, bloating     Referred by Dr. Osei       HPI:    Accompanied by self and history is obtained from self.    Rhonda Carrero is a 44 y.o. year old female with a PMH significant for irregular menses who presents for a consultation from PCP for abdominal pain, bloating, change in bowel habits.    HPI    Abd pain/change in bowel habits  Ongoing several yrs, getting worse in past 2-3 yrs  Gets constipation (3-4 days w/o BM) -> abdominal pain, bloating  Followed by explosive diarrhea  -sometimes black/tarry, sometime mucousy  After this, continues to feel discomfort/aching  Baseline goes back to [place] Type 1-2 stools, then constipation  Does not take anything for constipation/diarrhea  Wakes in AM with burning sensation in stomach, nausea -> better w/ eating  CBC, CMP, TSH within the year all WNL    She denies fevers, chills, unintentional weight loss, odynophagia, dysphagia, heartburn/regurgitation, vomiting, rectal pain, hematochezia.    Takes betaine HCl with meals  Takes vitamin D, iodine, B12  -no other supplements    GI History:  Previous issues: None  Blood thinners: ASA: no antiplatelet: no anticoagulation: no  NSAID use: Motrin 1-2x/month at most for scoliosis pain  Caffeine use: none  Tobacco use: none  EtOH use: very rare  Cannabis use: none    Abdominal Surgical Hx:  (, )  Family Hx: Denies first degree relatives with GI malignancies.  GI procedure Hx:  EGD:   Colonoscopy: none  GI imaging Hx: None    Review of Systems   Constitutional:  Negative for appetite change, chills, fever and unexpected weight change.   HENT:  Negative for dental problem, mouth sores, sore throat, trouble swallowing and voice change.    Respiratory:  Negative for cough and choking.    Cardiovascular:  Negative for chest pain and leg swelling.   Gastrointestinal:  Positive for abdominal pain, constipation, diarrhea and nausea. Negative for abdominal distention, anal bleeding, blood in stool, rectal pain and vomiting.   Genitourinary:  Negative for dysuria,  "frequency and hematuria.   Musculoskeletal:  Positive for arthralgias and myalgias.   Skin:  Negative for pallor and rash.   Neurological:  Negative for weakness, light-headedness and headaches.   Psychiatric/Behavioral:  Negative for confusion and sleep disturbance. The patient is not nervous/anxious.        Historical Information   Past Medical History:   Diagnosis Date    Scoliosis      Past Surgical History:   Procedure Laterality Date     SECTION      x2    FOOT NEUROMA SURGERY Left     HYSTEROSCOPY      With resection of uterine septum     Social History     Substance and Sexual Activity   Alcohol Use Yes    Comment: social     Social History     Substance and Sexual Activity   Drug Use Never     Social History     Tobacco Use   Smoking Status Former    Current packs/day: 0.25    Average packs/day: 0.3 packs/day for 5.0 years (1.3 ttl pk-yrs)    Types: Cigarettes   Smokeless Tobacco Never     Family History   Problem Relation Age of Onset    Stroke Mother     Hypertension Mother     Heart disease Mother     Hyperlipidemia Father     Hypertension Father     No Known Problems Sister     No Known Problems Sister     No Known Problems Sister     No Known Problems Brother     Prostate cancer Maternal Grandfather         75-80    Gallbladder disease Paternal Grandmother         50's; cancer    No Known Problems Daughter     Ovarian cancer Paternal Aunt     Colon cancer Neg Hx     Colon polyps Neg Hx        Meds/Allergies     Current Outpatient Medications:     Digestive Enzymes (BETAINE HCL PO)    sodium picosulfate, magnesium oxide, citric acid (Clenpiq) oral solution    Allergies   Allergen Reactions    Molds & Smuts Fatigue       PHYSICAL EXAM:    Blood pressure 118/70, height 5' 6\" (1.676 m), weight 61.2 kg (135 lb). Body mass index is 21.79 kg/m².    Physical Exam  Vitals and nursing note reviewed.   Constitutional:       General: She is not in acute distress.     Appearance: She is normal " weight. She is not toxic-appearing.   HENT:      Head: Normocephalic.      Mouth/Throat:      Mouth: Mucous membranes are moist.      Pharynx: Oropharynx is clear.   Eyes:      General: No scleral icterus.     Extraocular Movements: Extraocular movements intact.   Neck:      Trachea: Phonation normal.   Cardiovascular:      Rate and Rhythm: Normal rate and regular rhythm.      Heart sounds: No murmur heard.     No friction rub. No gallop.   Pulmonary:      Effort: Pulmonary effort is normal. No respiratory distress.      Breath sounds: No wheezing, rhonchi or rales.   Abdominal:      General: Abdomen is flat. Bowel sounds are normal. There is no distension or abdominal bruit.      Palpations: Abdomen is soft. There is no hepatomegaly or splenomegaly.      Tenderness: There is no abdominal tenderness. There is no guarding or rebound.   Musculoskeletal:      Cervical back: Neck supple.      Comments: Moving all 4 extremities spontaneously   Skin:     General: Skin is warm and dry.      Capillary Refill: Capillary refill takes less than 2 seconds.      Coloration: Skin is not jaundiced or pale.   Neurological:      General: No focal deficit present.      Mental Status: She is alert and oriented to person, place, and time.   Psychiatric:         Behavior: Behavior normal. Behavior is cooperative.         Thought Content: Thought content normal.         Lab Results:   Lab Results   Component Value Date    WBC 5.49 09/29/2023    HGB 14.0 09/29/2023    MCV 93 09/29/2023     09/29/2023       Radiology Results:   US breast right limited (diagnostic)    Result Date: 4/19/2024  Narrative: DIAGNOSIS: Breast pain, right; Follow-up examination of abnormal mammogram TECHNIQUE: Ultrasound of the right breast(s) was performed. COMPARISONS: Prior breast imaging dated: 10/16/2023, 10/16/2023, 04/24/2020, 04/24/2020, and 10/30/2018 RELEVANT HISTORY: Family Breast Cancer History: No known family history of breast cancer. Family  Medical History: Family medical history includes ovarian cancer in paternal aunt. Personal History: Hormone history includes birth control. No known relevant surgical history. No known relevant medical history. RISK ASSESSMENT: 5 Year Tyrer-Cuzick: 0.88% 10 Year Tyrer-Cuzick: 2.07% Lifetime Tyrer-Cuzick: 11.98% INDICATION: Rhonda Carrero is a 44 y.o. female presenting for follow-up of a probable complicated cyst in the right breast. FINDINGS: RIGHT 1) CYST [A]: There is a 4 mm x 3 mm oval complicated cyst with circumscribed margins seen in the right breast at 4 o'clock, 2 cm from the nipple. Compared to the previous study, there are no significant changes.      Impression:  Stable appearance of a probable complicated cyst at 4:00 in the right breast.  Recommend bilateral diagnostic mammogram and right breast ultrasound in 6 months to establish 1 year stability. ASSESSMENT/BI-RADS CATEGORY: Right: 3 - Probably Benign Overall: 3 - Probably Benign RECOMMENDATION:      - Ultrasound in 6 months for the right breast.      - Diagnostic mammogram in 6 months for both breasts. Workstation ID: [id number]      I have spent a total time of 25 minutes on 04/26/24 in caring for this patient including Diagnostic results, Prognosis, Risks and benefits of tx options, Instructions for management, Patient and family education, Importance of tx compliance, Risk factor reductions, Impressions, Counseling / Coordination of care, Documenting in the medical record, Reviewing / ordering tests, medicine, procedures  , and Obtaining or reviewing history  .    Patient expressed understanding and had all questions and concerns addressed.    Nyasia Rome PA-C  04/26/24  12:33 PM    This chart was completed in part utilizing [employer] speech voice recognition software. Random word insertions, pronoun errors, and incomplete sentences are an occasional consequence of this system due to software limitations, and ambient  noise. Any questions or concerns about the content, text, or information contained within the body of this dictation should be directly addressed to the provider for clarification.

## 2024-04-26 NOTE — PATIENT INSTRUCTIONS
Common food culprits for gas & bloating:  Milk and dairy products (cheese, ice cream, milk)  Vegetables (asparagus, broccoli, [place] sprouts, cabbage, cauliflower, celery, corn, cucumber, kohlrabi, leeks, onions, parsnips, potatoes, radishes, rutabaga, turnips)  Fruits (apples, apricots, bananas, peaches, pears, prunes, raisins)  Whole grains (bagels, bran/bran cereal, pretzels, wheat and oats, wheat germ)  Liquids (beer, carbonated beverages, carbonated medications)  Miscellaneous (beans, artificial sweeteners, chewing gum)    Strategies to relieve gas & bloating:  Avoid common culprit foods as above.  Cut down on fat consumed in your diet.  Cooked veggies may be less bothersome than raw veggies.  Try IB-sanchez, available over the counter. This is an enteric-coated peppermint formulation that is very effective at reducing abdominal discomfort, bloating, and excessive gas.  Gas-X, Beano, and Gaviscon are good gas-reducing medications available over the counter. Beano is most effective when taken with a food that you are sensitive to.  Eat smaller, more frequent meals.

## 2024-04-29 ENCOUNTER — ANESTHESIA (OUTPATIENT)
Dept: ANESTHESIOLOGY | Facility: AMBULATORY SURGERY CENTER | Age: 44
End: 2024-04-29

## 2024-04-29 ENCOUNTER — ANESTHESIA EVENT (OUTPATIENT)
Dept: ANESTHESIOLOGY | Facility: AMBULATORY SURGERY CENTER | Age: 44
End: 2024-04-29

## 2024-05-15 ENCOUNTER — TELEPHONE (OUTPATIENT)
Dept: GASTROENTEROLOGY | Facility: CLINIC | Age: 44
End: 2024-05-15

## 2024-06-07 ENCOUNTER — ANESTHESIA EVENT (OUTPATIENT)
Dept: ANESTHESIOLOGY | Facility: AMBULATORY SURGERY CENTER | Age: 44
End: 2024-06-07

## 2024-06-07 ENCOUNTER — ANESTHESIA (OUTPATIENT)
Dept: ANESTHESIOLOGY | Facility: AMBULATORY SURGERY CENTER | Age: 44
End: 2024-06-07

## 2024-06-21 ENCOUNTER — HOSPITAL ENCOUNTER (OUTPATIENT)
Dept: GASTROENTEROLOGY | Facility: AMBULATORY SURGERY CENTER | Age: 44
Discharge: HOME | End: 2024-06-21
Attending: INTERNAL MEDICINE
Payer: COMMERCIAL

## 2024-06-21 ENCOUNTER — ANESTHESIA EVENT (OUTPATIENT)
Dept: GASTROENTEROLOGY | Facility: AMBULATORY SURGERY CENTER | Age: 44
End: 2024-06-21

## 2024-06-21 ENCOUNTER — ANESTHESIA (OUTPATIENT)
Dept: GASTROENTEROLOGY | Facility: AMBULATORY SURGERY CENTER | Age: 44
End: 2024-06-21

## 2024-06-21 VITALS
HEART RATE: 50 BPM | RESPIRATION RATE: 16 BRPM | DIASTOLIC BLOOD PRESSURE: 57 MMHG | HEIGHT: 66 IN | BODY MASS INDEX: 21.21 KG/M2 | WEIGHT: 132 LBS | SYSTOLIC BLOOD PRESSURE: 123 MMHG | TEMPERATURE: 97.5 F | OXYGEN SATURATION: 100 %

## 2024-06-21 DIAGNOSIS — R19.4 CHANGE IN BOWEL HABITS: ICD-10-CM

## 2024-06-21 DIAGNOSIS — R10.84 GENERALIZED ABDOMINAL PAIN: ICD-10-CM

## 2024-06-21 LAB
EXT PREGNANCY TEST URINE: NEGATIVE
EXT. CONTROL: NORMAL

## 2024-06-21 PROCEDURE — 45378 DIAGNOSTIC COLONOSCOPY: CPT | Performed by: INTERNAL MEDICINE

## 2024-06-21 RX ORDER — PROPOFOL 10 MG/ML
INJECTION, EMULSION INTRAVENOUS AS NEEDED
Status: DISCONTINUED | OUTPATIENT
Start: 2024-06-21 | End: 2024-06-21

## 2024-06-21 RX ORDER — LIDOCAINE HYDROCHLORIDE 20 MG/ML
INJECTION, SOLUTION EPIDURAL; INFILTRATION; INTRACAUDAL; PERINEURAL AS NEEDED
Status: DISCONTINUED | OUTPATIENT
Start: 2024-06-21 | End: 2024-06-21

## 2024-06-21 RX ORDER — SODIUM CHLORIDE, SODIUM LACTATE, POTASSIUM CHLORIDE, CALCIUM CHLORIDE 600; 310; 30; 20 MG/100ML; MG/100ML; MG/100ML; MG/100ML
50 INJECTION, SOLUTION INTRAVENOUS CONTINUOUS
Status: DISCONTINUED | OUTPATIENT
Start: 2024-06-21 | End: 2024-06-25 | Stop reason: HOSPADM

## 2024-06-21 RX ADMIN — PROPOFOL 100 MG: 10 INJECTION, EMULSION INTRAVENOUS at 09:42

## 2024-06-21 RX ADMIN — PROPOFOL 30 MG: 10 INJECTION, EMULSION INTRAVENOUS at 09:49

## 2024-06-21 RX ADMIN — PROPOFOL 50 MG: 10 INJECTION, EMULSION INTRAVENOUS at 09:47

## 2024-06-21 RX ADMIN — LIDOCAINE HYDROCHLORIDE 40 MG: 20 INJECTION, SOLUTION EPIDURAL; INFILTRATION; INTRACAUDAL; PERINEURAL at 09:42

## 2024-06-21 RX ADMIN — PROPOFOL 50 MG: 10 INJECTION, EMULSION INTRAVENOUS at 09:43

## 2024-06-21 RX ADMIN — SODIUM CHLORIDE, SODIUM LACTATE, POTASSIUM CHLORIDE, CALCIUM CHLORIDE 50 ML/HR: 600; 310; 30; 20 INJECTION, SOLUTION INTRAVENOUS at 09:16

## 2024-06-21 RX ADMIN — PROPOFOL 50 MG: 10 INJECTION, EMULSION INTRAVENOUS at 09:53

## 2024-06-21 NOTE — ANESTHESIA PREPROCEDURE EVALUATION
Procedure:  COLONOSCOPY    Relevant Problems   No relevant active problems        Physical Exam    Airway    Mallampati score: II  TM Distance: >3 FB  Neck ROM: full     Dental   No notable dental hx     Cardiovascular      Pulmonary      Other Findings  post-pubertal.      Anesthesia Plan  ASA Score- 1     Anesthesia Type- IV sedation with anesthesia with ASA Monitors.         Additional Monitors:     Airway Plan:            Plan Factors-Exercise tolerance (METS): >4 METS.    Chart reviewed.    Patient summary reviewed.    Patient is not a current smoker.              Induction- intravenous.    Postoperative Plan-         Informed Consent- Anesthetic plan and risks discussed with patient.  I personally reviewed this patient with the CRNA. Discussed and agreed on the Anesthesia Plan with the CRNA..

## 2024-06-21 NOTE — H&P
History and Physical - [facility]  Rhonda Carrero 44 y.o. female MRN: [id number]    HPI: Rhonda Carrero is a 44 y.o. female who presents for colonoscopy for change of bowel habits    REVIEW OF SYSTEMS: Per the HPI, and otherwise unremarkable.    Historical Information   Past Medical History:   Diagnosis Date    Scoliosis      Past Surgical History:   Procedure Laterality Date     SECTION      x2    FOOT NEUROMA SURGERY Left     HYSTEROSCOPY      With resection of uterine septum     Social History   Social History     Substance and Sexual Activity   Alcohol Use Yes    Comment: social     Social History     Substance and Sexual Activity   Drug Use Never     Social History     Tobacco Use   Smoking Status Former    Current packs/day: 0.25    Average packs/day: 0.3 packs/day for 5.0 years (1.3 ttl pk-yrs)    Types: Cigarettes   Smokeless Tobacco Never     Family History   Problem Relation Age of Onset    Stroke Mother     Hypertension Mother     Heart disease Mother     Hyperlipidemia Father     Hypertension Father     No Known Problems Sister     No Known Problems Sister     No Known Problems Sister     No Known Problems Brother     Prostate cancer Maternal Grandfather         75-80    Gallbladder disease Paternal Grandmother         50's; cancer    No Known Problems Daughter     Ovarian cancer Paternal Aunt     Colon cancer Neg Hx     Colon polyps Neg Hx        Meds/Allergies       Current Outpatient Medications:     Cyanocobalamin (VITAMIN B-12 PO)    Digestive Enzymes (BETAINE HCL PO)    IODINE, KELP, PO    PREBIOTIC PRODUCT PO    Probiotic Product (PROBIOTIC PO)    VITAMIN D PO    sodium picosulfate, magnesium oxide, citric acid (Clenpiq) oral solution    Current Facility-Administered Medications:     lactated ringers infusion, 50 mL/hr, Intravenous, Continuous, 50 mL/hr at 24 0916    Allergies   Allergen Reactions    Molds & Smuts Fatigue       Objective     /75   " Pulse 65   Temp 97.5 °F (36.4 °C) (Temporal)   Resp 20   Ht 5' 6\" (1.676 m)   Wt 59.9 kg (132 lb)   LMP 06/03/2024 (Exact Date)   SpO2 99%   BMI 21.31 kg/m²     PHYSICAL EXAM    General Appearance: NAD, cooperative, alert  Eyes: Anicteric  GI:  Soft, non-tender, non-distended; normal bowel sounds; no masses, no organomegaly   Rectal: Deferred until procedure  Musculoskeletal: No edema.  Skin:  No jaundice    ASSESSMENT/PLAN:  This is a 44 y.o. year old female here for colonoscopy, and she is stable and optimized for her procedure.        "

## 2024-06-21 NOTE — ANESTHESIA POSTPROCEDURE EVALUATION
Post-Op Assessment Note    CV Status:  Stable  Pain Score: 0    Pain management: adequate       Mental Status:  Alert and awake   Hydration Status:  Euvolemic   PONV Controlled:  Controlled   Airway Patency:  Patent     Post Op Vitals Reviewed: Yes    No anethesia notable event occurred.    Staff: CRNA               BP   96/53   Temp      Pulse  50   Resp   15   SpO2   97

## 2024-08-29 ENCOUNTER — OFFICE VISIT (OUTPATIENT)
Dept: GASTROENTEROLOGY | Facility: CLINIC | Age: 44
End: 2024-08-29
Payer: COMMERCIAL

## 2024-08-29 VITALS
SYSTOLIC BLOOD PRESSURE: 132 MMHG | WEIGHT: 137 LBS | DIASTOLIC BLOOD PRESSURE: 80 MMHG | HEIGHT: 66 IN | BODY MASS INDEX: 22.02 KG/M2

## 2024-08-29 DIAGNOSIS — K58.1 IRRITABLE BOWEL SYNDROME WITH CONSTIPATION: Primary | ICD-10-CM

## 2024-08-29 DIAGNOSIS — K63.829 INTESTINAL METHANOGEN OVERGROWTH: ICD-10-CM

## 2024-08-29 DIAGNOSIS — Z12.11 COLON CANCER SCREENING: ICD-10-CM

## 2024-08-29 PROCEDURE — 99213 OFFICE O/P EST LOW 20 MIN: CPT | Performed by: PHYSICIAN ASSISTANT

## 2024-08-29 NOTE — PROGRESS NOTES
[facility] - Outpatient Follow-up Note  Rhonda Carrero 44 y.o. female MRN: [id number]  Encounter: [id number]    ASSESSMENT AND PLAN:    1. Irritable bowel syndrome with constipation  Chronic, improved with treatment of IMO and increased dietary fiber. Negative workup to date includes CBC, CMP, TSH, colonoscopy. Currently moving her bowels every other day ([place] Type 4)  Recommend increasing dietary fiber to goal of 25-30 g/day, fiber supplementation (Benefiber) as needed to reach this daily goal. Increase daily fluid/water intake to 2 L (64 oz)  Recommend introducing fermented foods to diet - handout provided today  Continue pre/probiotic daily  Please call the office or schedule an appointment if symptoms worsen or fail to improve.  If sx worsen, would consider trial of senna prn vs Linzess 72 mcg QAM    2. Intestinal methanogen overgrowth  (+) SIBO test through PCP, improved with abx treatment  Recommend fermented foods, continue pre/probiotic    3. Colon cancer screening  Last colonoscopy: 6/21/2024  recall: 10 yr(s)  due: 6/2034     Return if symptoms worsen or fail to improve.  ______________________________________________________________________    Chief Complaint   Patient presents with    Follow-up     Pt had colon       HPI:   Accompanied by self and history is obtained from self.    Patient is a 44 y.o. female with a significant PMH of irregular menses presenting for follow up regarding constipation.    Abdominal Pain  This is a chronic problem. The current episode started more than 1 year ago. The onset quality is gradual. The problem occurs daily. The most recent episode lasted 2 days. The problem has been rapidly improving. The pain is located in the suprapubic region. The pain is at a severity of 3/10. The quality of the pain is aching, dull, a sensation of fullness and sharp. The abdominal pain radiates to the pelvis. Associated symptoms include arthralgias.  "Pertinent negatives include no anorexia, belching, constipation, diarrhea, dysuria, fever, flatus, frequency, headaches, hematochezia, hematuria, melena, myalgias, nausea, vomiting or weight loss. The pain is aggravated by bowel movement and movement. The pain is relieved by Bowel movements and recumbency. Prior diagnostic workup includes lower endoscopy.       Constipation  Last seen  by myself for the same  S/p colonoscopy : unremarkable  Advised increasing fiber/fluids  -----  Overall, feeling better  Did SIBO test through PCP which was (+) for methanogen overgrowth, completed course of 2 abx for this with good results  Lot of food sensitivities have resolved  Having soft BMs every other day  No more diarrhea, black stools  Bloating is better overall  Is taking prebiotic + probiotic  Trying new fermented foods    IBS EMMETT IV criteria:  Recurrent abdominal pain  At least 1 day/week YES  In the last 3 months YES  Associated with 2+ of the following:  Related to defecation YES  Associated with a change in stool frequency YES  Associated with a change in stool form (appearance) YES    Workup to date: (-): CBC, CMP, TSH  EGD:   Colonoscopy: 2024, 10 yr recall: \"IMPRESSION:  The terminal ileum and entire colon appeared normal.  Small hemorrhoids\"  Recent GI imaging: None    Review of Systems   Constitutional:  Negative for fever and weight loss.   Gastrointestinal:  Negative for abdominal pain, anorexia, constipation, diarrhea, flatus, hematochezia, melena, nausea and vomiting.   Genitourinary:  Negative for dysuria, frequency and hematuria.   Musculoskeletal:  Positive for arthralgias. Negative for myalgias.   Neurological:  Negative for headaches.       Historical Information   Past Medical History:   Diagnosis Date    Scoliosis     Small intestinal bacterial overgrowth (SIBO)      Past Surgical History:   Procedure Laterality Date     SECTION      x2    COLONOSCOPY      FOOT NEUROMA SURGERY Left " "2021    HYSTEROSCOPY  2012    With resection of uterine septum     Social History     Substance and Sexual Activity   Alcohol Use Yes    Comment: social     Social History     Substance and Sexual Activity   Drug Use Never     Social History     Tobacco Use   Smoking Status Former    Current packs/day: 0.25    Average packs/day: 0.3 packs/day for 5.0 years (1.3 ttl pk-yrs)    Types: Cigarettes   Smokeless Tobacco Never     Family History   Problem Relation Age of Onset    Stroke Mother     Hypertension Mother     Heart disease Mother     Hyperlipidemia Father     Hypertension Father     No Known Problems Sister     No Known Problems Sister     No Known Problems Sister     No Known Problems Brother     Prostate cancer Maternal Grandfather         75-80    Gallbladder disease Paternal Grandmother         50's; cancer    No Known Problems Daughter     Ovarian cancer Paternal Aunt     Colon cancer Neg Hx     Colon polyps Neg Hx          Current Outpatient Medications:     Cyanocobalamin (VITAMIN B-12 PO)    Digestive Enzymes (BETAINE HCL PO)    IODINE, KELP, PO    PREBIOTIC PRODUCT PO    Probiotic Product (PROBIOTIC PO)    VITAMIN D PO  Allergies   Allergen Reactions    Molds & Smuts Fatigue     Reviewed medications and allergies and updated as indicated    PHYSICAL EXAM:    Blood pressure 132/80, height 5' 6\" (1.676 m), weight 62.1 kg (137 lb). Body mass index is 22.11 kg/m².    Physical Exam  Vitals and nursing note reviewed.   Constitutional:       General: She is not in acute distress.     Appearance: She is not toxic-appearing.   HENT:      Head: Normocephalic.      Mouth/Throat:      Mouth: Mucous membranes are moist.      Pharynx: Oropharynx is clear.   Eyes:      General: No scleral icterus.     Extraocular Movements: Extraocular movements intact.   Neck:      Trachea: Phonation normal.   Cardiovascular:      Comments: Appears well perfused  Pulmonary:      Effort: Pulmonary effort is normal. No respiratory " distress.   Musculoskeletal:      Cervical back: Neck supple.      Comments: Moving all 4 extremities spontaneously   Skin:     General: Skin is warm and dry.      Capillary Refill: Capillary refill takes less than 2 seconds.      Coloration: Skin is not jaundiced or pale.   Neurological:      General: No focal deficit present.      Mental Status: She is alert and oriented to person, place, and time.   Psychiatric:         Behavior: Behavior normal. Behavior is cooperative.         Thought Content: Thought content normal.         Lab Results:   Lab Results   Component Value Date    WBC 5.49 09/29/2023    HGB 14.0 09/29/2023    MCV 93 09/29/2023     09/29/2023     Radiology Results:   No results found.    I have spent a total time of 15 minutes on 08/29/24 in caring for this patient including Diagnostic results, Prognosis, Risks and benefits of tx options, Instructions for management, Patient and family education, Importance of tx compliance, Risk factor reductions, Impressions, Counseling / Coordination of care, Documenting in the medical record, Reviewing / ordering tests, medicine, procedures  , and Obtaining or reviewing history  .    Patient expressed understanding and had all questions and concerns addressed.    Nyasia Rome PA-C  08/29/24  11:17 AM    This chart was completed in part utilizing [employer] speech voice recognition software. Random word insertions, pronoun errors, and incomplete sentences are an occasional consequence of this system due to software limitations, and ambient noise. Any questions or concerns about the content, text, or information contained within the body of this dictation should be directly addressed to the provider for clarification.

## 2024-08-29 NOTE — PATIENT INSTRUCTIONS
Top 15 Fermented Foods  Below is a list of some of the best fermented foods to include in your diet:  » fermented foods are best consumed uncooked because heat above 45º C or 113º F will kill the good bacteria «    1. Kefir  Kefir is a fermented milk product (made from cow, goat or sheep’s milk) that tastes like a drinkable yogurt. Kefir benefits include providing high levels of vitamin B12, calcium, magnesium, vitamin K2, biotin, folate, enzymes and probiotics.  Kefir has been consumed for well over 3,000 years. The term kefir was started in [place] and [place] and means “feeling good.”    2. Kombucha  After being fermented, kombucha becomes carbonated and contains vinegar, B vitamins, enzymes, probiotics and a high concentration of acid (acetic, gluconic and lactic).  Kombucha is a fermented drink made of black tea and sugar (from various sources like cane sugar, fruit or honey). It contains a colony of bacteria and yeast that is responsible for initiating the fermentation process once combined with sugar.  Do fermented foods like kombucha contain alcohol? Kombucha has trace amounts of alcohol but too little to cause intoxication or even to be noticeable.  Other fermented foods, such as yogurt or fermented veggies, typically do not have any alcohol at all.    3. Sauerkraut  Studies suggest that sauerkraut has a variety of beneficial effects on human health. It can help boost digestive health, aid in circulation, fight inflammation, strengthen bones and reduce cholesterol levels.   Sauerkraut is one of the oldest traditional foods, with very long roots in [place], [place] and Chinese cuisine, dating back 2,000 years or more. Sauerkraut means “sour cabbage” in [place], although the Germans weren’t actually the first to make sauerkraut. (It’s believed the Chinese were.)  Made from fermented green or red cabbage, sauerkraut is high in fiber, vitamin A, vitamin C, vitamin K and B vitamins. It’s also a great source of iron,  copper, calcium, sodium, manganese and magnesium.  Is store-bought sauerkraut fermented? Not always, especially the canned/processed kind.  Real, traditional, fermented sauerkraut needs to be refrigerated, is usually stored in glass jars and says that it is fermented on the package/label.    4. Pickles  Pickles alone can help address the all-too-common vitamin K deficiency, as one small pickle contains a healthy dose of this fat-soluble vitamin, which plays an important role in bone and heart health.   Didn’t think that pickles had probiotics? Fermented pickles contain a ton vitamins and minerals, plus antioxidants and gut-friendly probiotic bacteria.  Are store-bought pickles fermented? Not usually.  Most store-bought pickles are made with vinegar and cucumbers, and although this makes the pickles taste sour, this doesn’t lead to natural fermentation. Fermented pickles should be made with cucumbers and brine (salt + water).  What is the best brand of pickles if you want probiotics? When choosing a jar of pickles, look for “lactic acid fermented pickles” made by a  that uses organic products and brine, refrigerates the pickles, and states that the pickles have been fermented.  If you can find a local maker, such as at a [employer] market, you’ll get some of the best probiotics for your health.    5. Miso  Miso has anti-aging properties and can help maintain healthy skin. It also boosts the immune system, may help lower the risk of certain types of cancer, improves bone health and promotes a healthy nervous system.   Miso is created by fermenting soybeans, barley or brown rice with scarlet, a type of fungus. It’s a traditional [place] ingredient in recipes including miso soup.  It’s been a staple in Chinese and [place] diets for approximately 2,500 years.    6. Tempeh  Tempeh contains high levels of vitamins B5, B6, B3 and B2. Eating it regularly may help reduce cholesterol, increase bone density, reduce  menopausal symptoms, promote muscle recovery and has roughly the same protein content as meat.   Another beneficial fermented food made with soybeans is tempeh, a product that is created by combining soybeans with a tempeh starter (which is a mix of live mold). When it sits for a day or two, this results it in becoming a dense, cake-like product that contains both probiotics and a hefty dose of protein too.  Tempeh is similar to tofu but not as spongy and more “grainy.”    7. Natto  It contains the extremely powerful probiotic bacillus subtilis, which has been proven to support the immune system and cardiovascular health. It also enhances the digestion of vitamin K2. In addition to these natto benefits, it contains a powerful anti-inflammatory enzyme called nattokinase that has been shown to potentially have cancer-fighting effects.   Natto is a popular food in [place] consisting of fermented soybeans. It is sometimes even eaten for breakfast in [place] and commonly combined with soy sauce, karashi mustard and Japanese bunching onion.  After fermentation it develops a strong smell, deep flavor and sticky, slimy texture that not everyone who is new to natto appreciates.    8. Kimchi  Kimchi is known to improve cardiovascular and digestive health and has high levels of antioxidants that may help reduce the risk of serious health conditions, such as cancer, diabetes, obesity and gastric ulcers.   Kimchi is a traditional fermented [place] dish that is made from vegetables, including cabbage, plus spices like imer, garlic, pepper and other seasoning. It’s often added to Korean recipes like rice bowls, ramen or bibimbap.  It’s considered a [place] delicacy that dates back to the seventh century.    9. Raw Cheese  Raw milk cheeses are made with milk that hasn’t been pasteurized. Goat milk, sheep milk and A2 cows soft cheeses are particularly high in probiotics, including thermophillus, bifidus, bulgaricus and acidophilus.  In  order to find real fermented/aged cheeses, read the ingredient label and look for cheese that has not been pasteurized. The label should indicate that the cheese is raw and has been aged for six months or more.    10. Yogurt  Yogurt intake has been found to be associated with better overall diet quality, healthier metabolic profiles and healthier blood pressure.   Is fermented milk the same as yogurt? Essentially, yes.  Yogurt and kefir are unique dairy products because they are highly available and some of the top probiotic foods that many people eat regularly. Probiotic yogurt is now the most consumed fermented dairy product in the [place] and many other industrialized nations too.  It’s recommend when buying yogurt to look for three things:  It comes from goat or sheep milk if you have trouble digesting cow’s milk.  It’s made from the milk of animals that have been grass-fed.  It’s organic.  No added sugars!    11. Apple Cider Vinegar  Apple cider vinegar that is raw and contains “the mother” is fermented and does contain some probiotics. It also contains certain types of acids like acetic acid, which supports the function of probiotics and prebiotics in your gut.  However, most vinegars available in the supermarket do not contain probiotics.  You can add one tablespoon of apple cider vinegar to a drink twice a day. Before breakfast and lunch or breakfast and dinner, add one tablespoon of apple cider vinegar in your meal, and then start consuming more fermented vegetables like sauerkraut and kimchi or drinking kvass to really boost probiotic levels.    12. Kvass  Kvass is a traditional fermented beverage that has a similar taste to beer. Much like kombucha, it goes through a fermentation process and contains probiotics.  It’s made from stale, sourdough rye bread and is considered a non-alcoholic beverage because it contains only around 0.5 percent to 1 percent alcohol. The longer it ferments, the more  susceptible it is to becoming more alcoholic.  If you’ve never tasted kvass, it has a tangy, earthy, salty flavor and can be an acquired taste. Sometimes it is brewed with flavors from fruits (such as raisins and strawberries) and herbs (such as mint) to make it more appealing.    13. Sourdough Bread  Certain traditionally made breads, such as real sourdough bread, are fermented, but they don’t contain probiotics. Fermentation helps make nutrients found in the grains more available for absorption and reduces antinutrient content that may make digestion difficult.    14. Cottage Cheese  Because more research is confirming that probiotics are highly beneficial, food manufacturers are beginning to make probiotic dairy products such as cottage cheese more readily available. Similar to yogurt, cottage cheese can be fermented when bacteria help break down the lactose (a type of sugar) in the dairy.  When purchasing cottage cheese, look for brands that are low in sugar and that contain active cultures. Some types are also called dry curd cottage cheese or farmer’s cheese.    15. Coconut Kefir  For those who can’t tolerate dairy, coconut kefir is a great alternative. This probiotic-rich drink is made with creamy coconut milk and kefir grains, but unlike regular kefir or yogurt it’s dairy-free and vegan-friendly.  Try it in smoothies, in baked goods, with fruit, on its own, etc. Just opt for brands that are low in sugar or unflavored, and consider adding your own stevia, fruit or honey for extra flavor.    Fermented Food Recipes:  Here are ideas for adding fermented foods to your diet:  Add sauerkraut and pickles to your favorite burger slider recipe.  Try adding yogurt or kefir to your favorite smoothie recipes.  Make a salad dressing with apple cider vinegar, raw honey, olive oil and chrissy mustard, and toss on one of your favorite salads. You can add cultured veggies like radish, sauerkraut, etc., to salads as well.  Make  a meatless dinner by subbing tempeh for meat in a Buddha bowl recipe.  Try a miso soup recipe with mushrooms.  Add kimchi to a veggie stir-stack or homemade ramen bowl.  Sip on kombucha, combined with some seltzer if you’d like, instead of soda or other sweetened drinks.    How Often Should You Eat Fermented Foods?  If you’re new to fermented foods, start by having about a half a cup per day, and build up gradually from there. This gives your gut time to adjust to the presence of new bacteria.  It’s best to eat a variety of different fermented foods, since each one offers different beneficial bacteria.  Where can you buy fermented foods? These days, you can find them at just about any supermarket.  Yogurt is widely available, and other fermented foods like kefir, sauerkraut and kimchi are becoming easier to find. Look for fermented foods in health food stores, large supermarkets and at your local farmers market.  It’s also wise to eat plenty of prebiotic foods and high-fiber foods daily (such as artichokes, bananas, onions and other plants), which help “feed” probiotics in the gut.    Risks and Side Effects  Why might fermented foods be bad for you? While they certainly have lots of benefits to offer, one disadvantage of fermented foods is that when you consume too much, especially too quickly, you may deal with some digestive issues. These can include bloating or diarrhea.  Start slowly, and experiment with different kinds to find your favorites.  If you have a sensitive digestive system you may want to start off with a smaller amount, like several tablespoons of kefir or one probiotic capsule a day, and work your way up.  For the greatest fermented foods benefits, try to purchase foods that are organic and contain “live and active cultures.” This is better than the label “made with active cultures.”  After fermentation, some poor quality products may be heat-treated, which kills off both good and bad bacteria  (extending shelf life). Ideally you want to find raw, organic and local products that do not contain lots of sugar or additives.      Sourced from:  15 Fermented Foods for Healthy Gut and Overall Health - Dr. Aguilar  [email or web address]  By Clementina Prado [facility]  December 26, 2022      For your constipation, please try the following:  -Include kiwis, pears, prunes/plums in your diet. These fruits naturally promote bowel movements.  -Try Smooth Move tea, available at your local grocery store. It is made with senna, an ancient herbal constipation remedy that is very safe and effective.  -Please increase your dietary fiber as we discussed at your office visit.      Fiber Supplementation Instructions     Soluble fiber (such as Benefiber®) is easy to incorporate into your diet as it is tasteless and can be mixed with food or drink. Begin with 1 tbsp once a day and then gradually increase to 1 tbsp 3 times a day over the period of a few days. Stir Benefiber® into 4-8 ounces of liquid (carbonated beverages are not recommended) or mix with soft food (hot or cold). Stir well until dissolved. Increase your fluid intake as necessary to ensure you are drinking 7-8 glasses of water every day. Supplemental fiber should be taken with meals for greatest benefit. Many less-expensive generic versions of Benefiber® are available with similar active components (pictured below)      Psyllium is also another type of fiber supplementation that you can try as well. It comes in formulations of capsules, Gummies, powders.  I would read the back of the label to see the dosing of the fiber supplementation.  The goal for amount of fiber a day is 25 to 50 g of fiber daily.

## 2024-09-01 DIAGNOSIS — Z00.6 ENCOUNTER FOR EXAMINATION FOR NORMAL COMPARISON OR CONTROL IN CLINICAL RESEARCH PROGRAM: ICD-10-CM

## 2024-10-21 ENCOUNTER — HOSPITAL ENCOUNTER (OUTPATIENT)
Dept: MAMMOGRAPHY | Facility: CLINIC | Age: 44
Discharge: HOME | End: 2024-10-21
Payer: COMMERCIAL

## 2024-10-21 ENCOUNTER — HOSPITAL ENCOUNTER (OUTPATIENT)
Dept: ULTRASOUND IMAGING | Facility: CLINIC | Age: 44
Discharge: HOME | End: 2024-10-21
Payer: COMMERCIAL

## 2024-10-21 DIAGNOSIS — R92.8 ABNORMAL FINDINGS ON DIAGNOSTIC IMAGING OF BREAST: ICD-10-CM

## 2024-10-21 PROCEDURE — 76642 ULTRASOUND BREAST LIMITED: CPT

## 2024-10-21 PROCEDURE — G0279 TOMOSYNTHESIS, MAMMO: HCPCS

## 2024-10-21 PROCEDURE — 77066 DX MAMMO INCL CAD BI: CPT

## 2024-11-06 ENCOUNTER — ANNUAL EXAM (OUTPATIENT)
Dept: GYNECOLOGY | Facility: CLINIC | Age: 44
End: 2024-11-06
Payer: COMMERCIAL

## 2024-11-06 VITALS
WEIGHT: 141.4 LBS | SYSTOLIC BLOOD PRESSURE: 114 MMHG | HEIGHT: 65 IN | BODY MASS INDEX: 23.56 KG/M2 | DIASTOLIC BLOOD PRESSURE: 76 MMHG

## 2024-11-06 DIAGNOSIS — Z12.31 ENCOUNTER FOR SCREENING MAMMOGRAM FOR BREAST CANCER: ICD-10-CM

## 2024-11-06 DIAGNOSIS — N92.6 IRREGULAR MENSES: ICD-10-CM

## 2024-11-06 DIAGNOSIS — Z01.419 WOMEN'S ANNUAL ROUTINE GYNECOLOGICAL EXAMINATION: Primary | ICD-10-CM

## 2024-11-06 PROCEDURE — S0612 ANNUAL GYNECOLOGICAL EXAMINA: HCPCS | Performed by: OBSTETRICS & GYNECOLOGY

## 2024-11-06 NOTE — PROGRESS NOTES
Assessment & Plan   Diagnoses and all orders for this visit:    Women's annual routine gynecological examination  -     IGP,rfx Apt HPV ASCU,16/18,45    Encounter for screening mammogram for breast cancer  -     Mammo screening bilateral w 3d and cad; Future    Irregular menses  -     CBC and Platelet; Future  -     hCG, quantitative; Future  -     Follicle stimulating hormone; Future  -     CBC and Platelet  -     hCG, quantitative  -     Follicle stimulating hormone    1. yearly exam-Pap smear done with reflex HPV, self breast awareness reviewed, calcium/vitamin D recommendations reviewed, mammogram request given, colonoscopy due recommended after age 45.  2.  Irregular menses-had menses twice per month for the previous 4 months prior to this last month.  She would have bleeding for 4 to 5 days, off 7 to 10 days then recurrence of bleeding for 4 to 5 days and this occurred for 4 months time.  She then had most recent menses was 8 eight days late on 11/4/2024, with scant bleeding noted today.  Did discuss evaluation and she wished to proceed.  Had recent TSH done through her primary provider which was okay.  She will get me this result.  Laboratory sheet given for CBC, hCG, and FSH.  To return near future for sonohysterogram with endometrial biopsy.  Of note, had testing last year on 10/31/2023 with sonohysterogram demonstrating no focal findings and ultrasound with 7 mm calcified fibroid.  Left ovary 1.6 cm follicle.  Endometrial biopsy with inactive endometrium on that date.  3. previous right breast discomfort-had bilateral diagnostic mammogram and right breast ultrasound from 10/21/2024 which was negative.  Screening was recommended in follow-up, request given  4.  History of uterine septum-status post resection prior to having her children.  No septum was noted definitively at prior sonohysterogram.  To assess at upcoming sonohysterogram.  5. uterine myoma-7 mm, fundal.  No submucosal extension was noted at  sonohysterogram  6. prior history ovarian cyst-none noted at 2023 ultrasound  7. history of  delivery x 2-please see prior note for details.  Follow-up near future for sonohysterogram with endometrial biopsy or as needed.    Subjective   Patient ID: Rhonda Carrero is a 44 y.o. female.    Vitals:    24 1548   BP: 114/76     Patient was seen today for yearly exam.  Please see assessment plan for details.        The following portions of the patient's history were reviewed and updated as appropriate: allergies, current medications, past family history, past medical history, past social history, past surgical history, and problem list.  Past Medical History:   Diagnosis Date    Scoliosis     Small intestinal bacterial overgrowth (SIBO)      Past Surgical History:   Procedure Laterality Date     SECTION      x2    COLONOSCOPY      FOOT NEUROMA SURGERY Left     HYSTEROSCOPY      With resection of uterine septum     OB History    Para Term  AB Living   4 2 0 2 2 2   SAB IAB Ectopic Multiple Live Births   1   1   2      # Outcome Date GA Lbr Ernesto/2nd Weight Sex Type Anes PTL Lv   4 Ectopic     F CS-Unspec   JAYLA      Birth Comments:  at 21 weeks, delivery at 37 weeks   3      M CS-Unspec   JAYLA   2 SAB 2000           1                 Current Outpatient Medications:     Cyanocobalamin (VITAMIN B-12 PO), Take 1 tablet by mouth in the morning, Disp: , Rfl:     Digestive Enzymes (BETAINE HCL PO), Take by mouth 3 (three) times a day with meals, Disp: , Rfl:     IODINE, KELP, PO, Take 1 tablet by mouth daily, Disp: , Rfl:     PREBIOTIC PRODUCT PO, Take 1 tablet by mouth daily, Disp: , Rfl:     Probiotic Product (PROBIOTIC PO), Take 1 tablet by mouth in the morning, Disp: , Rfl:     VITAMIN D PO, Take 1 tablet by mouth in the morning, Disp: , Rfl:   Allergies   Allergen Reactions    Molds & Smuts Fatigue     Social History     Socioeconomic History     "Marital status: /Civil Union     Spouse name: None    Number of children: None    Years of education: None    Highest education level: None   Occupational History    None   Tobacco Use    Smoking status: Former     Current packs/day: 0.25     Average packs/day: 0.3 packs/day for 5.0 years (1.3 ttl pk-yrs)     Types: Cigarettes    Smokeless tobacco: Never   Vaping Use    Vaping status: Never Used   Substance and Sexual Activity    Alcohol use: Yes     Comment: social    Drug use: Never    Sexual activity: Yes     Partners: Male   Other Topics Concern    None   Social History Narrative    None     Social Determinants of Health     Financial Resource Strain: Not on file   Food Insecurity: Not on file   Transportation Needs: Not on file   Physical Activity: Not on file   Stress: Not on file   Social Connections: Not on file   Intimate Partner Violence: Not on file   Housing Stability: Not on file     Family History   Problem Relation Age of Onset    Stroke Mother     Hypertension Mother     Heart disease Mother     Hyperlipidemia Father     Hypertension Father     No Known Problems Sister     No Known Problems Sister     No Known Problems Sister     No Known Problems Brother     Prostate cancer Maternal Grandfather         75-80    Gallbladder disease Paternal Grandmother         50's; cancer    No Known Problems Daughter     Ovarian cancer Paternal Aunt     Colon cancer Neg Hx     Colon polyps Neg Hx        Review of Systems    Objective   Physical Exam  OBGyn Exam     Objective      /76 (BP Location: Right arm, Patient Position: Sitting)   Ht 5' 4.75\" (1.645 m)   Wt 64.1 kg (141 lb 6.4 oz)   LMP 11/04/2024   BMI 23.71 kg/m²     General:   alert and oriented, in no acute distress   Neck: normal to inspection and palpation   Breast: normal appearance, no masses or tenderness   Heart:    Lungs:    Abdomen: soft, non-tender, without masses or organomegaly   Vulva: normal   Vagina: Scant amount of dark " menstrual blood, without erythema or lesions   Cervix: Scant amount of dark menstrual blood, without lesions or cervicitis.  No CMT   Uterus: top normal size, anteverted, non-tender   Adnexa: no mass, fullness, tenderness   Rectum: negative    Psych:  Normal mood and affect   Skin:  Without obvious lesions   Eyes: symmetric, with normal movements and reactivity   Musculoskeletal:  Normal muscle tone and movements appreciated

## 2024-11-12 LAB
CYTOLOGIST CVX/VAG CYTO: NORMAL
DX ICD CODE: NORMAL
OTHER STN SPEC: NORMAL
OTHER STN SPEC: NORMAL
PATH REPORT.FINAL DX SPEC: NORMAL
SL AMB NOTE:: NORMAL
SL AMB SPECIMEN ADEQUACY: NORMAL
SL AMB TEST METHODOLOGY: NORMAL

## 2024-11-13 ENCOUNTER — RESULTS FOLLOW-UP (OUTPATIENT)
Dept: GYNECOLOGY | Facility: CLINIC | Age: 44
End: 2024-11-13

## 2024-11-22 LAB
ERYTHROCYTE [DISTWIDTH] IN BLOOD BY AUTOMATED COUNT: 11.7 % (ref 11.7–15.4)
FSH SERPL-ACNC: 12.4 MIU/ML
HCG INTACT+B SERPL-ACNC: <1 MIU/ML
HCT VFR BLD AUTO: 42.5 % (ref 34–46.6)
HGB BLD-MCNC: 14.2 G/DL (ref 11.1–15.9)
MCH RBC QN AUTO: 31.3 PG (ref 26.6–33)
MCHC RBC AUTO-ENTMCNC: 33.4 G/DL (ref 31.5–35.7)
MCV RBC AUTO: 94 FL (ref 79–97)
PLATELET # BLD AUTO: 226 X10E3/UL (ref 150–450)
RBC # BLD AUTO: 4.54 X10E6/UL (ref 3.77–5.28)
WBC # BLD AUTO: 9 X10E3/UL (ref 3.4–10.8)

## 2024-11-22 NOTE — RESULT ENCOUNTER NOTE
Follicle-stimulating hormone, hCG, and CBC are all normal/negative.  Will review in detail at follow-up sonohysterogram/endometrial biopsy next month.

## 2024-12-18 NOTE — PROGRESS NOTES
AMB US Pelvic Non OB    Date/Time: 12/19/2024 2:30 PM    Performed by: Karen Amador  Authorized by: Chuckie Abdul MD  Universal Protocol:  Consent: Verbal consent obtained.  Consent given by: patient  Timeout called at: 12/19/2024 2:39 PM.  Patient understanding: patient states understanding of the procedure being performed  Patient identity confirmed: verbally with patient    Procedure details:     SIS Procedure: Yes    Indications: non-obstetric vaginal bleeding      Technique:  Transvaginal US, Non-OB    Position: lithotomy exam    Uterine findings:     Length (cm): 8.3    Height (cm):  4.14    Width (cm):  4.85    Endometrial stripe: identified    Left ovary findings:     Left ovary:  Visualized    Length (cm): 3.06    Height (cm): 1.74    Width (cm): 1.71  Right ovary findings:     Right ovary:  Visualized    Length (cm): 2.14    Height (cm): 1.4    Width (cm): 1.64  Other findings:     Free pelvic fluid: not identified      Free peritoneal fluid: not identified    Post-Procedure Details:     Impression:  Anteverted uterus demonstrates an anterior calcified fundal fibroid 7mm, stable. The bilateral ovaries appear within normal limits. No free fluid.   Additional Procedure Comments:      Muufri F8 E8C-RS transvaginal transducer Serial # [id number] was used to perform the examination today and subsequently followed with high level disinfection utilizing Trophon EPR procedure.       Ultrasound performed at:     [facility]  [facility] [address] [address]  Phone:  [phone]  Fax:  [phone]      Sonohysterogram    Date/Time: 12/19/2024 2:30 PM    Performed by: Chuckie Abdul MD  Authorized by: Chuckie Abdul MD  Universal Protocol:  Consent: Verbal consent obtained.  Consent given by: patient  Timeout called at: 12/19/2024 2:42 PM.  Patient understanding: patient states understanding of the procedure being performed  Patient identity confirmed: verbally with patient    Pre-procedure:      Prepped with: Hibiclens    Procedure:     Cervix cleaned and prepped: yes      Tenaculum applied to cervix: yes      Uterus sounded: yes      Catheter inserted: yes      Uterine cavity distended with saline: yes    Post-procedure:     Patient observed: yes      Post procedure instructions given to patient: yes      Patient tolerated procedure well with no complications: yes    Comments:      Sonohysterogram demonstrates a polyp within the endometrium.   Endometrial biopsy    Date/Time: 12/19/2024 2:30 PM    Performed by: Chuckie Abdul MD  Authorized by: Chuckie Abdul MD  Universal Protocol:  Consent: Verbal consent obtained.  Consent given by: patient  Timeout called at: 12/19/2024 2:42 PM.  Patient understanding: patient states understanding of the procedure being performed  Patient identity confirmed: verbally with patient    Procedure:     Procedure: endometrial biopsy with Pipelle      A bivalve speculum was placed in the vagina: yes      Cervix cleaned and prepped: yes      Specimen collected: specimen collected and sent to pathology      Patient tolerated procedure well with no complications: yes

## 2024-12-19 ENCOUNTER — PROCEDURE VISIT (OUTPATIENT)
Dept: GYNECOLOGY | Facility: CLINIC | Age: 44
End: 2024-12-19
Payer: COMMERCIAL

## 2024-12-19 ENCOUNTER — ULTRASOUND (OUTPATIENT)
Dept: GYNECOLOGY | Facility: CLINIC | Age: 44
End: 2024-12-19
Payer: COMMERCIAL

## 2024-12-19 DIAGNOSIS — N93.9 ABNORMAL UTERINE BLEEDING (AUB): Primary | ICD-10-CM

## 2024-12-19 DIAGNOSIS — N92.6 IRREGULAR MENSES: Primary | ICD-10-CM

## 2024-12-19 DIAGNOSIS — N84.0 ENDOMETRIAL POLYP: ICD-10-CM

## 2024-12-19 LAB — SL AMB POCT URINE HCG: NORMAL

## 2024-12-19 PROCEDURE — 81025 URINE PREGNANCY TEST: CPT | Performed by: OBSTETRICS & GYNECOLOGY

## 2024-12-19 PROCEDURE — 76831 ECHO EXAM UTERUS: CPT | Performed by: OBSTETRICS & GYNECOLOGY

## 2024-12-19 PROCEDURE — 58340 CATHETER FOR HYSTEROGRAPHY: CPT | Performed by: OBSTETRICS & GYNECOLOGY

## 2024-12-19 PROCEDURE — 99214 OFFICE O/P EST MOD 30 MIN: CPT | Performed by: OBSTETRICS & GYNECOLOGY

## 2024-12-19 PROCEDURE — 88305 TISSUE EXAM BY PATHOLOGIST: CPT | Performed by: STUDENT IN AN ORGANIZED HEALTH CARE EDUCATION/TRAINING PROGRAM

## 2024-12-19 PROCEDURE — 58100 BIOPSY OF UTERUS LINING: CPT | Performed by: OBSTETRICS & GYNECOLOGY

## 2024-12-19 NOTE — PATIENT INSTRUCTIONS
No outpatient medications have been marked as taking for the 12/19/24 encounter (Procedure visit) with Chuckie Abdul MD.

## 2024-12-19 NOTE — H&P (VIEW-ONLY)
Assessment & Plan   Diagnoses and all orders for this visit:    Irregular menses    Endometrial polyp    1.  Irregular menses with endometrial polyp-has noted bleeding twice per month for the past 5+ months or so.  Laboratory studies were notable for normal hemoglobin 14.2, hCG was negative, FSH was 12.4..  TSH was normal elsewhere.  Sonohysterogram today demonstrated endometrial polyp.  Endometrial biopsy was done and we will inform her of the findings.  Counseled in detail about the findings.  Given her irregular bleeding, would recommend hysteroscopy with D&C with polypectomy.  Risk and benefits of the procedure were discussed in detail and all questions were answered.  Consent was signed, the patient was scheduled for the surgery in the near future.  2. previous uterine septum-had resection prior to having children.  No definite findings were noted of this at sonohysterogram.  To assess at hysteroscopy  3. 7 cm fundal myoma-no submucosal extension was noted.  4. history of right breast discomfort-denies complaints.  5. prior history of ovarian cyst-none noted on ultrasound today.  6. prior history of  delivery x 2  7. other-Works as ultrasound tech at  center.  Follow-up 2 weeks post procedure or as needed.    Subjective   Patient ID: Rhonda Carrero is a 44 y.o. female.    There were no vitals filed for this visit.  Patient was seen today for sonohysterogram with endometrial biopsy.  Please see procedure note and assessment and plan for details.      The following portions of the patient's history were reviewed and updated as appropriate: allergies, current medications, past family history, past medical history, past social history, past surgical history, and problem list.  Past Medical History:   Diagnosis Date    Scoliosis     Small intestinal bacterial overgrowth (SIBO)      Past Surgical History:   Procedure Laterality Date     SECTION      x2    COLONOSCOPY      FOOT NEUROMA SURGERY Left      HYSTEROSCOPY      With resection of uterine septum     OB History    Para Term  AB Living   4 2 0 2 2 2   SAB IAB Ectopic Multiple Live Births   1  1  2      # Outcome Date GA Lbr Ernesto/2nd Weight Sex Type Anes PTL Lv   4 Ectopic     F CS-Unspec   JAYLA      Birth Comments:  at 21 weeks, delivery at 37 weeks   3      M CS-Unspec   JAYLA   2 SAB 2000           1                 Current Outpatient Medications:     Cyanocobalamin (VITAMIN B-12 PO), Take 1 tablet by mouth in the morning, Disp: , Rfl:     Digestive Enzymes (BETAINE HCL PO), Take by mouth 3 (three) times a day with meals, Disp: , Rfl:     IODINE, KELP, PO, Take 1 tablet by mouth daily, Disp: , Rfl:     PREBIOTIC PRODUCT PO, Take 1 tablet by mouth daily, Disp: , Rfl:     Probiotic Product (PROBIOTIC PO), Take 1 tablet by mouth in the morning, Disp: , Rfl:     VITAMIN D PO, Take 1 tablet by mouth in the morning, Disp: , Rfl:   Allergies   Allergen Reactions    Molds & Smuts Fatigue     Social History     Socioeconomic History    Marital status: /Civil Union     Spouse name: Not on file    Number of children: Not on file    Years of education: Not on file    Highest education level: Not on file   Occupational History    Not on file   Tobacco Use    Smoking status: Former     Current packs/day: 0.25     Average packs/day: 0.3 packs/day for 5.0 years (1.3 ttl pk-yrs)     Types: Cigarettes    Smokeless tobacco: Never   Vaping Use    Vaping status: Never Used   Substance and Sexual Activity    Alcohol use: Yes     Comment: social    Drug use: Never    Sexual activity: Yes     Partners: Male   Other Topics Concern    Not on file   Social History Narrative    Not on file     Social Drivers of Health     Financial Resource Strain: Not on file   Food Insecurity: Not on file   Transportation Needs: Not on file   Physical Activity: Not on file   Stress: Not on file   Social Connections: Not on file   Intimate  Partner Violence: Not on file   Housing Stability: Not on file     Family History   Problem Relation Age of Onset    Stroke Mother     Hypertension Mother     Heart disease Mother     Hyperlipidemia Father     Hypertension Father     No Known Problems Sister     No Known Problems Sister     No Known Problems Sister     No Known Problems Brother     Prostate cancer Maternal Grandfather         75-80    Gallbladder disease Paternal Grandmother         50's; cancer    No Known Problems Daughter     Ovarian cancer Paternal Aunt     Colon cancer Neg Hx     Colon polyps Neg Hx        Review of Systems   Constitutional:  Negative for chills, diaphoresis, fatigue and fever.   Respiratory:  Negative for apnea, cough, chest tightness, shortness of breath and wheezing.    Cardiovascular:  Negative for chest pain, palpitations and leg swelling.   Gastrointestinal:  Negative for abdominal distention, abdominal pain, anal bleeding, constipation, diarrhea, nausea, rectal pain and vomiting.   Genitourinary:  Positive for menstrual problem. Negative for difficulty urinating, dyspareunia, dysuria, frequency, hematuria, pelvic pain, urgency, vaginal bleeding, vaginal discharge and vaginal pain.   Musculoskeletal:  Negative for arthralgias, back pain and myalgias.   Skin:  Negative for color change and rash.   Neurological:  Negative for dizziness, syncope, light-headedness, numbness and headaches.   Hematological:  Negative for adenopathy. Does not bruise/bleed easily.   Psychiatric/Behavioral:  Negative for dysphoric mood and sleep disturbance. The patient is not nervous/anxious.        Objective   Physical Exam  OBGyn Exam     Objective      There were no vitals taken for this visit.    General:   alert and oriented, in no acute distress   Neck:    Breast:    Heart: Regular rate and rhythm   Lungs: Clear to auscultation   Abdomen: soft, non-tender, without masses or organomegaly   Vulva: normal   Vagina: Without erythema or lesions  or discharge.  Normal   Cervix: Without lesions or discharge or cervicitis.  No Cervical motion tenderness   Uterus: top normal size, anteverted, non-tender   Adnexa: no mass, fullness, tenderness   Rectum: deferred    Psych:  Normal mood and affect   Skin:  Without obvious lesions   Eyes: symmetric, with normal movements and reactivity   Musculoskeletal:  Normal muscle tone and movements appreciated

## 2024-12-20 ENCOUNTER — TELEPHONE (OUTPATIENT)
Dept: GYNECOLOGY | Facility: CLINIC | Age: 44
End: 2024-12-20

## 2024-12-20 NOTE — TELEPHONE ENCOUNTER
Patient was in office for a procedure and we spoke in person.. Scheduled her surgery for January 7, 2025.  Entered case and labs ordered.. My chart messaged patient about her labs .. Teams number given..

## 2024-12-20 NOTE — TELEPHONE ENCOUNTER
----- Message from Chuckie Abdul MD sent at 2024  4:24 PM EST -----  [facility] GYN Department  Surgery Scheduling Sheet    Patient Name: Rhonda Carrero  : 1980    Provider: Chuckie Abdul MD     Needed: no; Language: N/A    Procedure: exam under anesthesia, dilation and curettage , and hysteroscopy, endometrial polypectomy    Diagnosis: Irregular menses, endometrial polyp    Special Needs or Equipment: Possible Symphion    Anesthesia: General anesthesia    Length of stay: outpatient  Does patient have comorbid conditions that will require close perioperative monitoring prior to safe discharge: no    The patient has comorbid conditions that will require close perioperative monitoring prior to safe discharge, including N/A.   This may require acute care beyond the usual and routine recovery period. As such, inpatient admission post-operatively is expected and appropriate, and anticipated hospital length of stay will be >2 midnights.    Pre-Admission Testing Needed: yes   Labs that should be ordered: cbc and urine pregnancy test    Order PAT that is recommended in prep for procedure?: Yes    Medical Clearance Needed: no; Provider: N/A    MA Form Signed (tubals/hysterectomy): Not Indicated    Surgical Drink Given: no     How many days out of work: 1 day(s)     How many days no drivin day(s)       Is pre op appt needed?  no  Interval for post op appt: 2 week(s)       For Surgical Scheduler:     Surgery Scheduled On:  [place]: [facility]    Pre-op Appt:   Post op Appt:  Consult/Medical clearance appt:

## 2024-12-26 ENCOUNTER — RESULTS FOLLOW-UP (OUTPATIENT)
Dept: GYNECOLOGY | Facility: CLINIC | Age: 44
End: 2024-12-26

## 2024-12-26 PROCEDURE — 88305 TISSUE EXAM BY PATHOLOGIST: CPT | Performed by: STUDENT IN AN ORGANIZED HEALTH CARE EDUCATION/TRAINING PROGRAM

## 2024-12-26 NOTE — RESULT ENCOUNTER NOTE
Endometrial biopsy pathology is benign, with secretory endometrium.  To proceed with hysteroscopy with D&C with polypectomy as scheduled in the near future.

## 2024-12-27 ENCOUNTER — APPOINTMENT (OUTPATIENT)
Dept: LAB | Facility: HOSPITAL | Age: 44
End: 2024-12-27
Payer: COMMERCIAL

## 2024-12-27 ENCOUNTER — RESULTS FOLLOW-UP (OUTPATIENT)
Dept: GYNECOLOGY | Facility: CLINIC | Age: 44
End: 2024-12-27

## 2024-12-27 DIAGNOSIS — Z01.818 PRE-OP TESTING: Primary | ICD-10-CM

## 2024-12-27 DIAGNOSIS — Z00.6 ENCOUNTER FOR EXAMINATION FOR NORMAL COMPARISON OR CONTROL IN CLINICAL RESEARCH PROGRAM: ICD-10-CM

## 2024-12-27 LAB
ERYTHROCYTE [DISTWIDTH] IN BLOOD BY AUTOMATED COUNT: 12.4 % (ref 11.6–15.1)
HCT VFR BLD AUTO: 42.5 % (ref 34.8–46.1)
HGB BLD-MCNC: 14.3 G/DL (ref 11.5–15.4)
MCH RBC QN AUTO: 30.8 PG (ref 26.8–34.3)
MCHC RBC AUTO-ENTMCNC: 33.6 G/DL (ref 31.4–37.4)
MCV RBC AUTO: 91 FL (ref 82–98)
PLATELET # BLD AUTO: 256 THOUSANDS/UL (ref 149–390)
PMV BLD AUTO: 11.2 FL (ref 8.9–12.7)
RBC # BLD AUTO: 4.65 MILLION/UL (ref 3.81–5.12)
WBC # BLD AUTO: 6.38 THOUSAND/UL (ref 4.31–10.16)

## 2024-12-27 PROCEDURE — 85027 COMPLETE CBC AUTOMATED: CPT

## 2024-12-27 PROCEDURE — 36415 COLL VENOUS BLD VENIPUNCTURE: CPT

## 2025-01-03 ENCOUNTER — ANESTHESIA EVENT (OUTPATIENT)
Dept: PERIOP | Facility: HOSPITAL | Age: 45
End: 2025-01-03
Payer: COMMERCIAL

## 2025-01-03 RX ORDER — VITAMIN B COMPLEX
1 CAPSULE ORAL DAILY
COMMUNITY

## 2025-01-03 RX ORDER — RIBOFLAVIN (VITAMIN B2) 100 MG
100 TABLET ORAL DAILY
COMMUNITY

## 2025-01-03 NOTE — PRE-PROCEDURE INSTRUCTIONS
Pre-Surgery Instructions:   Medication Instructions    Ascorbic Acid (vitamin C) 100 MG tablet Stop taking 7 days prior to surgery.    b complex vitamins capsule Stop taking 7 days prior to surgery.    Cyanocobalamin (VITAMIN B-12 PO) Stop taking 7 days prior to surgery.    Digestive Enzymes (BETAINE HCL PO) Stop taking 7 days prior to surgery.    IODINE, KELP, PO Stop taking 7 days prior to surgery.    PREBIOTIC PRODUCT PO Stop taking 7 days prior to surgery.    Probiotic Product (PROBIOTIC PO) Stop taking 7 days prior to surgery.    VITAMIN D PO Stop taking 7 days prior to surgery.        Medication instructions for day surgery reviewed. Please use only a sip of water to take your instructed medications. Avoid all over the counter vitamins, supplements and NSAIDS for one week prior to surgery per anesthesia guidelines. Tylenol is ok to take as needed.     You will receive a call one business day prior to surgery with an arrival time and hospital directions. If your surgery is scheduled on a Monday, the hospital will be calling you on the Friday prior to your surgery. If you have not heard from anyone by 8pm, please call the hospital supervisor through the hospital  at [phone]. ([place] [phone] or [place] [phone]).    Do not eat or drink anything after midnight the night before your surgery, including candy, mints, lifesavers, or chewing gum. Do not drink alcohol 24hrs before your surgery. Try not to smoke at least 24hrs before your surgery.       Follow the pre surgery showering instructions as listed in the “My Surgical Experience Booklet” or otherwise provided by your surgeon's office. Do not use a blade to shave the surgical area 1 week before surgery. It is okay to use a clean electric clippers up to 24 hours before surgery. Do not apply any lotions, creams, including makeup, cologne, deodorant, or perfumes after showering on the day of your surgery. Do not use dry shampoo, hair spray,  hair gel, or any type of hair products.     No contact lenses, eye make-up, or artificial eyelashes. Remove nail polish, including gel polish, and any artificial, gel, or acrylic nails if possible. Remove all jewelry including rings and body piercing jewelry.     Wear causal clothing that is easy to take on and off. Consider your type of surgery.    Keep any valuables, jewelry, piercings at home. Please bring any specially ordered equipment (sling, braces) if indicated.    Arrange for a responsible person to drive you to and from the hospital on the day of your surgery. Please confirm the visitor policy for the day of your procedure when you receive your phone call with an arrival time.     Call the surgeon's office with any new illnesses, exposures, or additional questions prior to surgery.    Please reference your “My Surgical Experience Booklet” for additional information to prepare for your upcoming surgery.

## 2025-01-06 LAB
APOB+LDLR+PCSK9 GENE MUT ANL BLD/T: NOT DETECTED
BRCA1+BRCA2 DEL+DUP + FULL MUT ANL BLD/T: NOT DETECTED
MLH1+MSH2+MSH6+PMS2 GN DEL+DUP+FUL M: NOT DETECTED

## 2025-01-07 ENCOUNTER — ANESTHESIA (OUTPATIENT)
Dept: PERIOP | Facility: HOSPITAL | Age: 45
End: 2025-01-07
Payer: COMMERCIAL

## 2025-01-07 ENCOUNTER — HOSPITAL ENCOUNTER (OUTPATIENT)
Facility: HOSPITAL | Age: 45
Setting detail: OUTPATIENT SURGERY
Discharge: HOME | End: 2025-01-07
Attending: OBSTETRICS & GYNECOLOGY | Admitting: OBSTETRICS & GYNECOLOGY
Payer: COMMERCIAL

## 2025-01-07 VITALS
WEIGHT: 140.65 LBS | RESPIRATION RATE: 16 BRPM | DIASTOLIC BLOOD PRESSURE: 77 MMHG | TEMPERATURE: 98 F | OXYGEN SATURATION: 99 % | SYSTOLIC BLOOD PRESSURE: 129 MMHG | HEART RATE: 60 BPM | BODY MASS INDEX: 22.08 KG/M2 | HEIGHT: 67 IN

## 2025-01-07 DIAGNOSIS — N84.0 ENDOMETRIAL POLYP: ICD-10-CM

## 2025-01-07 DIAGNOSIS — N92.6 IRREGULAR MENSES: ICD-10-CM

## 2025-01-07 LAB
EXT PREGNANCY TEST URINE: NEGATIVE
EXT. CONTROL: NORMAL

## 2025-01-07 PROCEDURE — 58558 HYSTEROSCOPY BIOPSY: CPT | Performed by: OBSTETRICS & GYNECOLOGY

## 2025-01-07 PROCEDURE — 88305 TISSUE EXAM BY PATHOLOGIST: CPT | Performed by: PATHOLOGY

## 2025-01-07 PROCEDURE — 81025 URINE PREGNANCY TEST: CPT | Performed by: OBSTETRICS & GYNECOLOGY

## 2025-01-07 RX ORDER — ACETAMINOPHEN 325 MG/1
975 TABLET ORAL EVERY 6 HOURS PRN
Status: DISCONTINUED | OUTPATIENT
Start: 2025-01-07 | End: 2025-01-07 | Stop reason: HOSPADM

## 2025-01-07 RX ORDER — KETOROLAC TROMETHAMINE 30 MG/ML
INJECTION, SOLUTION INTRAMUSCULAR; INTRAVENOUS AS NEEDED
Status: DISCONTINUED | OUTPATIENT
Start: 2025-01-07 | End: 2025-01-07

## 2025-01-07 RX ORDER — FENTANYL CITRATE/PF 50 MCG/ML
25 SYRINGE (ML) INJECTION
Status: DISCONTINUED | OUTPATIENT
Start: 2025-01-07 | End: 2025-01-07 | Stop reason: HOSPADM

## 2025-01-07 RX ORDER — GLYCOPYRROLATE 0.2 MG/ML
INJECTION INTRAMUSCULAR; INTRAVENOUS AS NEEDED
Status: DISCONTINUED | OUTPATIENT
Start: 2025-01-07 | End: 2025-01-07

## 2025-01-07 RX ORDER — FENTANYL CITRATE 50 UG/ML
INJECTION, SOLUTION INTRAMUSCULAR; INTRAVENOUS AS NEEDED
Status: DISCONTINUED | OUTPATIENT
Start: 2025-01-07 | End: 2025-01-07

## 2025-01-07 RX ORDER — ONDANSETRON 2 MG/ML
4 INJECTION INTRAMUSCULAR; INTRAVENOUS ONCE AS NEEDED
Status: DISCONTINUED | OUTPATIENT
Start: 2025-01-07 | End: 2025-01-07 | Stop reason: HOSPADM

## 2025-01-07 RX ORDER — ONDANSETRON 2 MG/ML
4 INJECTION INTRAMUSCULAR; INTRAVENOUS EVERY 6 HOURS PRN
Status: DISCONTINUED | OUTPATIENT
Start: 2025-01-07 | End: 2025-01-07 | Stop reason: HOSPADM

## 2025-01-07 RX ORDER — MIDAZOLAM HYDROCHLORIDE 2 MG/2ML
INJECTION, SOLUTION INTRAMUSCULAR; INTRAVENOUS AS NEEDED
Status: DISCONTINUED | OUTPATIENT
Start: 2025-01-07 | End: 2025-01-07

## 2025-01-07 RX ORDER — SODIUM CHLORIDE 9 MG/ML
125 INJECTION, SOLUTION INTRAVENOUS CONTINUOUS
Status: DISCONTINUED | OUTPATIENT
Start: 2025-01-07 | End: 2025-01-07 | Stop reason: HOSPADM

## 2025-01-07 RX ORDER — ONDANSETRON 2 MG/ML
INJECTION INTRAMUSCULAR; INTRAVENOUS AS NEEDED
Status: DISCONTINUED | OUTPATIENT
Start: 2025-01-07 | End: 2025-01-07

## 2025-01-07 RX ORDER — SODIUM CHLORIDE 9 MG/ML
INJECTION, SOLUTION INTRAVENOUS AS NEEDED
Status: DISCONTINUED | OUTPATIENT
Start: 2025-01-07 | End: 2025-01-07 | Stop reason: HOSPADM

## 2025-01-07 RX ORDER — PROPOFOL 10 MG/ML
INJECTION, EMULSION INTRAVENOUS AS NEEDED
Status: DISCONTINUED | OUTPATIENT
Start: 2025-01-07 | End: 2025-01-07

## 2025-01-07 RX ORDER — LIDOCAINE HYDROCHLORIDE 20 MG/ML
INJECTION, SOLUTION EPIDURAL; INFILTRATION; INTRACAUDAL; PERINEURAL AS NEEDED
Status: DISCONTINUED | OUTPATIENT
Start: 2025-01-07 | End: 2025-01-07

## 2025-01-07 RX ORDER — EPHEDRINE SULFATE 50 MG/ML
INJECTION INTRAVENOUS AS NEEDED
Status: DISCONTINUED | OUTPATIENT
Start: 2025-01-07 | End: 2025-01-07

## 2025-01-07 RX ADMIN — FENTANYL CITRATE 25 MCG: 50 INJECTION INTRAMUSCULAR; INTRAVENOUS at 09:53

## 2025-01-07 RX ADMIN — ONDANSETRON 4 MG: 2 INJECTION INTRAMUSCULAR; INTRAVENOUS at 11:07

## 2025-01-07 RX ADMIN — FENTANYL CITRATE 25 MCG: 50 INJECTION INTRAMUSCULAR; INTRAVENOUS at 09:43

## 2025-01-07 RX ADMIN — MIDAZOLAM HYDROCHLORIDE 2 MG: 1 INJECTION, SOLUTION INTRAMUSCULAR; INTRAVENOUS at 09:24

## 2025-01-07 RX ADMIN — FENTANYL CITRATE 25 MCG: 50 INJECTION INTRAMUSCULAR; INTRAVENOUS at 09:28

## 2025-01-07 RX ADMIN — PROPOFOL 120 MCG/KG/MIN: 10 INJECTION, EMULSION INTRAVENOUS at 09:33

## 2025-01-07 RX ADMIN — KETOROLAC TROMETHAMINE 15 MG: 30 INJECTION, SOLUTION INTRAMUSCULAR; INTRAVENOUS at 09:59

## 2025-01-07 RX ADMIN — ONDANSETRON 4 MG: 2 INJECTION INTRAMUSCULAR; INTRAVENOUS at 09:27

## 2025-01-07 RX ADMIN — SODIUM CHLORIDE 125 ML/HR: 0.9 INJECTION, SOLUTION INTRAVENOUS at 11:09

## 2025-01-07 RX ADMIN — PROPOFOL 70 MG: 10 INJECTION, EMULSION INTRAVENOUS at 09:32

## 2025-01-07 RX ADMIN — LIDOCAINE HYDROCHLORIDE 40 MG: 20 INJECTION, SOLUTION EPIDURAL; INFILTRATION; INTRACAUDAL at 09:32

## 2025-01-07 RX ADMIN — GLYCOPYRROLATE 0.2 MG: 0.2 INJECTION, SOLUTION INTRAMUSCULAR; INTRAVENOUS at 09:24

## 2025-01-07 RX ADMIN — EPHEDRINE SULFATE 5 MG: 50 INJECTION INTRAVENOUS at 09:40

## 2025-01-07 RX ADMIN — SODIUM CHLORIDE 125 ML/HR: 0.9 INJECTION, SOLUTION INTRAVENOUS at 08:35

## 2025-01-07 RX ADMIN — FENTANYL CITRATE 25 MCG: 50 INJECTION INTRAMUSCULAR; INTRAVENOUS at 09:51

## 2025-01-07 NOTE — ANESTHESIA POSTPROCEDURE EVALUATION
Post-Op Assessment Note    CV Status:  Stable    Pain management: adequate       Mental Status:  Alert and awake   Hydration Status:  Euvolemic   PONV Controlled:  Controlled   Airway Patency:  Patent     Post Op Vitals Reviewed: Yes    No anethesia notable event occurred.    Staff: CRNA           Last Filed PACU Vitals:  Vitals Value Taken Time   Temp 97.5 °F (36.4 °C) 01/07/25 1012   Pulse 63 01/07/25 1012   /56 01/07/25 1012   Resp 16 01/07/25 1012   SpO2 95 % 01/07/25 1012       Modified Andrez:     Vitals Value Taken Time   Activity 2 01/07/25 1012   Respiration 2 01/07/25 1012   Circulation 2 01/07/25 1012   Consciousness 2 01/07/25 1012   Oxygen Saturation 2 01/07/25 1012     Modified Andrez Score: 10

## 2025-01-07 NOTE — ANESTHESIA POSTPROCEDURE EVALUATION
Post-Op Assessment Note    CV Status:  Stable    Pain management: adequate       Mental Status:  Alert and awake   Hydration Status:  Euvolemic   PONV Controlled:  Controlled   Airway Patency:  Patent     Post Op Vitals Reviewed: Yes    No anethesia notable event occurred.    Staff: CRNA           Last Filed PACU Vitals:  Vitals Value Taken Time   Temp 97.5 °F (36.4 °C) 01/07/25 1012   Pulse 63 01/07/25 1012   /56 01/07/25 1012   Resp 16 01/07/25 1012   SpO2 95 % 01/07/25 1012

## 2025-01-07 NOTE — OP NOTE
OPERATIVE REPORT  PATIENT NAME: Rhonda Carrero    :  1980  MRN: [id number]  Pt Location: [facility] OR ROOM 04    SURGERY DATE: 2025    Surgeons and Role:     * Chuckie Abdul MD - Primary     * Cecelia Messer MD - Assisting    Preop Diagnosis:  Irregular menses [N92.6]  Endometrial polyp [N84.0]    Post-Op Diagnosis Codes:     * Irregular menses [N92.6]     * Endometrial polyp [N84.0]    Procedure(s):  D&C W/ HYSTEROSCOPY. EUA    Specimen(s):  ID Type Source Tests Collected by Time Destination   1 : [facility] TISSUE EXAM Chuckie Abdul MD 2025 [id number]        Estimated Blood Loss:   Minimal    Drains:  * No LDAs found *    Anesthesia Type:   IV Sedation with Anesthesia    Operative Indications:  Irregular menses [N92.6]  Endometrial polyp [N84.0]    Operative Findings:  1.  External genitalia grossly normal in appearance.  No ulcerations, no lacerations, no lesions.  Bimanual exam revealed anteverted uterus with normal contours and freely mobile.  No adnexal masses palpated bilaterally.  2.  Vagina and cervix were grossly normal in appearance without any lacerations or lesions.   3. Uterus sounded to 8 cm.  4. Hysteroscopic examination revealed normal endometrial lining. No visible polyps or fibroids. Bilateral ostia were visualized.      Complications:   None    Procedure and Technique:  Patient was taken to the operating room where a time out was performed to confirm correct patient and correct procedure. Total Intravenous anesthesia (TIVA) was administered and the patient was positioned on the OR table in the dorsal lithotomy position. All pressure points were padded and a bhavya hugger was placed to maintain control of core body temperature. A bimanual exam was performed and the uterus was noted to be anteverted, normal in size and consistency with no palpable adnexal masses or fullness. The patient was prepped and draped in the usual sterile fashion.     A weighted speculum was inserted into the  vagina and a Agustin retractor was used to visualize the anterior lip of the cervix, which was then grasped with a single toothed tenaculum. The uterus was sounded to 8cm. The cervix was serially dilated to 16Fr using Anurag dilators for introduction of the hysteroscope.     Hysteroscope was introduced under direct visualization using normal saline solution as the distention media. Hysteroscope was advanced to the uterine fundus and the entire uterine cavity was inspected in a systematic manner. There was noted to be no visible polyps and fibroids. Hysteroscope was withdrawn and sharp curetting was performed, starting at the 12'oclock position and rotating a total of 360 degrees to cover all surfaces. Endometrial tissue was obtained and sent for pathology. The hysteroscope was then re-introduced under direct visualization, again using normal saline solution as the distention media. Entire uterine cavity was inspected in a systematic manner. Adequate curetting was confirmed and hysteroscopy was withdrawn. The single toothed tenaculum was removed from the anterior lip of the cervix. Good hemostasis was confirmed at the tenaculum puncture sites. Weighted speculum was then removed from the vagina.    At the conclusion of the procedure, all needle, sponge, and instrument counts were noted to be correct x2.     Dr. Abdul was present and participated in all key portions of the case.    Patient Disposition:  PACU     SIGNATURE: Cecelia Messer MD  DATE: January 7, 2025  TIME: 10:12 AM

## 2025-01-07 NOTE — ANESTHESIA PREPROCEDURE EVALUATION
Procedure:  D&C W/ HYSTEROSCOPY, ENDOMETRIAL POLYPECTOMY, EUA (Uterus)    Relevant Problems   Obstetrics/Gynecology   (+) Endometrial polyp   (+) Irregular menses   (+) Uterine septum      FEN/Gastrointestinal   (+) Irritable bowel syndrome with constipation        Physical Exam    Airway    Mallampati score: II  TM Distance: >3 FB       Dental   No notable dental hx     Cardiovascular  Rhythm: regular, Rate: normal    Pulmonary   Breath sounds clear to auscultation    Other Findings  post-pubertal.      Anesthesia Plan  ASA Score- 2     Anesthesia Type- IV sedation with anesthesia with ASA Monitors.         Additional Monitors:     Airway Plan:     Comment: GA PRN  Permanent jewlery on right wrist, and right ear piercing cannot be removed.       Plan Factors-Exercise tolerance (METS): >4 METS.    Chart reviewed.   Existing labs reviewed.     Patient is not a current smoker.      Obstructive sleep apnea risk education given perioperatively.        Induction- intravenous.    Postoperative Plan-         Informed Consent- Anesthetic plan and risks discussed with patient.

## 2025-01-07 NOTE — INTERVAL H&P NOTE
H&P reviewed. After examining the patient I find no changes in the patients condition since the H&P had been written.    Vitals:    01/07/25 0813   BP: 121/67   Pulse: 62   Resp: 16   Temp: 97.8 °F (36.6 °C)   SpO2: 98%

## 2025-01-09 ENCOUNTER — RESULTS FOLLOW-UP (OUTPATIENT)
Dept: GYNECOLOGY | Facility: CLINIC | Age: 45
End: 2025-01-09

## 2025-01-09 PROCEDURE — 88305 TISSUE EXAM BY PATHOLOGIST: CPT | Performed by: PATHOLOGY

## 2025-01-10 ENCOUNTER — TELEPHONE (OUTPATIENT)
Age: 45
End: 2025-01-10

## 2025-01-10 NOTE — TELEPHONE ENCOUNTER
Call transferred to me, spoke with patient she is looking for an appointment  for rash on eyelids and nose since September , keeps spreading and is concerned that it can go into the eyes . She saw her pcp and was told to reach out to our office .   Patient has been using otc creams , castor oil but nothing seems to help .   Denied recent exposure to chemicals but  she took prescribed liver detox in September .   Advised to send pictures via [employer] , next appointment available is on 02/2025 she would like to be seen sooner.

## 2025-01-10 NOTE — TELEPHONE ENCOUNTER
(I am putting my note in after Tabatha's, I received initial call for Warm Transfer to Derm Triage Pod)    Received call from Patient regarding Follow Up for Rash on face, spreading to under eyes and on eyelids. Offered first avail 1/29/25 in [place] but patient rejected as she is working and it is too far away. Offered next avail 2/2025 [place] but patient is worried that the rash may go into her eyes if she waits too long.     Warm Transferred to Derm Triage Pod

## 2025-01-13 NOTE — TELEPHONE ENCOUNTER
Called patient , no answer , lvm requesting a call back to schedule with one of our AP's on a Thursday or friday in [place] .

## 2025-01-15 ENCOUNTER — OFFICE VISIT (OUTPATIENT)
Dept: DERMATOLOGY | Facility: CLINIC | Age: 45
End: 2025-01-15
Payer: COMMERCIAL

## 2025-01-15 VITALS — TEMPERATURE: 98.4 F | WEIGHT: 146 LBS | BODY MASS INDEX: 23.04 KG/M2

## 2025-01-15 DIAGNOSIS — R21 RASH: Primary | ICD-10-CM

## 2025-01-15 DIAGNOSIS — L71.0 PERIORIFICIAL DERMATITIS: ICD-10-CM

## 2025-01-15 PROCEDURE — 99214 OFFICE O/P EST MOD 30 MIN: CPT

## 2025-01-15 RX ORDER — METRONIDAZOLE 10 MG/G
GEL TOPICAL DAILY
Qty: 60 G | Refills: 2 | Status: SHIPPED | OUTPATIENT
Start: 2025-01-15

## 2025-01-15 RX ORDER — DOXYCYCLINE 50 MG/1
50 TABLET ORAL 2 TIMES DAILY
Qty: 60 TABLET | Refills: 0 | Status: SHIPPED | OUTPATIENT
Start: 2025-01-15 | End: 2025-02-14

## 2025-01-15 NOTE — PATIENT INSTRUCTIONS
PERIORIFICIAL DERMATITIS    Assessment and Plan:  Based on a thorough discussion of this condition and the management approach to it (including a comprehensive discussion of the known risks, side effects and potential benefits of treatment), the patient (family) agrees to implement the following specific plan:  Start doxycycline 50 mg twice daily for 1 month. If clears before, can stop then  Start applying MetroGel 1% daily  Follow up in 1 month    What is periorificial dermatitis?  Periorificial dermatitis is a common facial skin problem characterized by groups of itchy or tender small red bumps. It is given this name because the bumps occur around the eyes, the nostrils, the mouth and occasionally, the genitals.  The more restrictive term, perioral dermatitis, is often used when the eruption is confined to the skin in the lower half of the face, particularly around the mouth. Periocular dermatitis may be used to describe the rash affecting the eyelids.  Periorificial dermatitis mainly affects adult women aged 15 to 45 years. It is less common in men. It can also affect children of any age.    What is the cause of periorificial or periorificial dermatitis?  The exact cause of periorificial is not understood. Periorificial dermatitis may be related to:  Skin barrier dysfunction  Activation of the body's immune system  Altered bacterial levels on the skin  Bacteria from hair follicles    Unlike seborrheic dermatitis which can affect similar areas of the face, malassezia yeasts are not involved in periorificial dermatitis.  Periorificial dermatitis may be directly caused by:  Topical steroids, whether applied deliberately to facial skin or inadvertently  Nasal steroids, steroid inhalers, and oral steroids  Cosmetic creams, make-ups and sunscreens  Fluorinated toothpaste  Neglecting to wash the face  Hormonal changes and/or oral contraceptives    What are the symptoms of periorificial dermatitis?  The characteristics  of facial periorificial dermatitis are:  Eruption on the chin, upper lip and eyelids   Sparing of the skin bordering the lips (which then appears pale), eyelids, nostrils  Clusters of 1-2 mm red bumps, potentially with pus  Dry and flaky skin surface  Burning irritation    In contrast to steroid-induced rosacea, periorificial dermatitis spares the cheeks and forehead.  Genital periorificial dermatitis has a similar clinical appearance. It involves the skin on and around labia majora (in females), scrotum (in males) and anus.    Granulomatous periorificial dermatitis is a variant of periorificial dermatitis that presents with persistent yellowish bumps. It occurs mainly in young children and nearly always follows the use of a corticosteroid.   Rebound flare of severe periorificial dermatitis may occur after abrupt cessation of application of potent topical steroid to facial skin.  The presentation of periorificial dermatitis is usually typical, so diagnosis can be made by history and skin exam. There are no specific tests.  Skin biopsy may occasionally be taken, and show similar findings to rosacea.     Periorificial dermatitis responds well to treatment, although it may take several weeks before there is a noticeable improvement.    General measures  Discontinue applying all face creams including topical steroids, cosmetics and sunscreens (zero therapy).  Consider a slower withdrawal from topical steroid/face creams if there is a severe flare after steroid cessation. Temporarily, replace it by a less potent or less occlusive cream or apply it less and less frequently until it is no longer required.  Wash the face with warm water alone while the rash is present. When it has cleared up, use a non-soap bar or liquid cleanser if you wish.  Choose a liquid or gel sunscreen.    Topical therapy: used to treat mild periorificial dermatitis. Choices include:  Erythromycin  Clindamycin  Metronidazole  Pimecrolimus  Azelaic  acid    Oral therapy: in more severe cases, a course of oral antibiotics may be prescribed for 6-12 weeks.  Most often, a tetracycline such as doxycycline is recommended. A sub-antimicrobial dose may be sufficient.  Oral erythromycin is used during pregnancy and in pre-pubertal children.  Oral low-dose isotretinoin may be used if antibiotics are ineffective or contraindicated.    Periorificial dermatitis can generally be prevented by the avoidance of topical steroids and occlusive face creams. When topical steroids are necessary to treat an inflammatory facial rash, they should be applied accurately to the affected area, no more than once daily in the lowest effective potency, and discontinued as soon as the rash responds.   Periorificial dermatitis sometimes recurs when the antibiotics are discontinued, or at a later date. The same treatment can be used again.

## 2025-01-15 NOTE — PROGRESS NOTES
"[facility] Dermatology Clinic Note     Patient Name: Rhonda Carrero  Encounter Date: 1/15/2025     Have you been cared for by a [facility] Dermatologist in the last 3 years and, if so, which description applies to you?    Yes.  I have been here within the last 3 years, and my medical history has NOT changed since that time.  I am FEMALE/of child-bearing potential.    REVIEW OF SYSTEMS:  Have you recently had or currently have any of the following? No changes in my recent health.   PAST MEDICAL HISTORY:  Have you personally ever had or currently have any of the following?  If \"YES,\" then please provide more detail. No changes in my medical history.   HISTORY OF IMMUNOSUPPRESSION: Do you have a history of any of the following:  Systemic Immunosuppression such as Diabetes, Biologic or Immunotherapy, Chemotherapy, Organ Transplantation, Bone Marrow Transplantation or Prednisone?  No     Answering \"YES\" requires the addition of the dotphrase \"IMMUNOSUPPRESSED\" as the first diagnosis of the patient's visit.   FAMILY HISTORY:  Any \"first degree relatives\" (parent, brother, sister, or child) with the following?    No changes in my family's known health.   PATIENT EXPERIENCE:    Do you want the Dermatologist to perform a COMPLETE skin exam today including a clinical examination under the \"bra and underwear\" areas?  NO  If necessary, do we have your permission to call and leave a detailed message on your Preferred Phone number that includes your specific medical information?  Yes      Allergies   Allergen Reactions   • Molds & Smuts Fatigue      Current Outpatient Medications:   •  Ascorbic Acid (vitamin C) 100 MG tablet, Take 100 mg by mouth daily, Disp: , Rfl:   •  b complex vitamins capsule, Take 1 capsule by mouth daily, Disp: , Rfl:   •  Cyanocobalamin (VITAMIN B-12 PO), Take 1 tablet by mouth in the morning, Disp: , Rfl:   •  Digestive Enzymes (BETAINE HCL PO), Take by mouth 3 (three) times a day with meals, Disp: , " Rfl:   •  doxycycline (ADOXA) 50 MG tablet, Take 1 tablet (50 mg total) by mouth 2 (two) times a day With a glass of water and something to eat., Disp: 60 tablet, Rfl: 0  •  IODINE, KELP, PO, Take 1 tablet by mouth daily, Disp: , Rfl:   •  metroNIDAZOLE (METROGEL) 1 % gel, Apply topically daily To face, Disp: 60 g, Rfl: 2  •  PREBIOTIC PRODUCT PO, Take 1 tablet by mouth daily, Disp: , Rfl:   •  Probiotic Product (PROBIOTIC PO), Take 1 tablet by mouth in the morning, Disp: , Rfl:   •  VITAMIN D PO, Take 1 tablet by mouth in the morning, Disp: , Rfl:         Whom besides the patient is providing clinical information about today's encounter?   NO ADDITIONAL HISTORIAN (patient alone provided history)    Physical Exam and Assessment/Plan by Diagnosis:    PERIORIFICIAL DERMATITIS    Physical Exam:  Anatomic Location Affected:  face-periocular, nose and perinasal, perioral  Morphological Description:  erythematous papules  Pertinent Positives:  Pertinent Negatives:    Additional History of Present Condition:  patient notes she started a liver detox and was doing homeopathic supplements prescribed by her doctor when rash began in September-no longer taking detox supplements. She notes started around her nose with blistering and has since started to spread over the face, getting close to the outer canthus of both eyes. She is using castor oil on the skin and OG moisturizer. She notes it flares and then calms down for a few days. She has tried metrogel with no benefit. Hx of perioral dermatitis. Patient sent in pictures on 1/10/2025. She notes today is a better day and it isn't very flared. Denies any use of topical steroids or fluorinated toothpaste    Assessment and Plan:  Based on a thorough discussion of this condition and the management approach to it (including a comprehensive discussion of the known risks, side effects and potential benefits of treatment), the patient (family) agrees to implement the following specific  plan:  Start doxycycline 50 mg twice daily for 1 month. You may stop sooner than 30 days if rash resolves  Start applying MetroGel 1% daily to entire affected areas   Follow up in 1 month    What is periorificial dermatitis?  Periorificial dermatitis is a common facial skin problem characterized by groups of itchy or tender small red bumps. It is given this name because the bumps occur around the eyes, the nostrils, the mouth and occasionally, the genitals.  The more restrictive term, perioral dermatitis, is often used when the eruption is confined to the skin in the lower half of the face, particularly around the mouth. Periocular dermatitis may be used to describe the rash affecting the eyelids.  Periorificial dermatitis mainly affects adult women aged 15 to 45 years. It is less common in men. It can also affect children of any age.    What is the cause of periorificial or periorificial dermatitis?  The exact cause of periorificial is not understood. Periorificial dermatitis may be related to:  Skin barrier dysfunction  Activation of the body's immune system  Altered bacterial levels on the skin  Bacteria from hair follicles    Unlike seborrheic dermatitis which can affect similar areas of the face, malassezia yeasts are not involved in periorificial dermatitis.  Periorificial dermatitis may be directly caused by:  Topical steroids, whether applied deliberately to facial skin or inadvertently  Nasal steroids, steroid inhalers, and oral steroids  Cosmetic creams, make-ups and sunscreens  Fluorinated toothpaste  Neglecting to wash the face  Hormonal changes and/or oral contraceptives    What are the symptoms of periorificial dermatitis?  The characteristics of facial periorificial dermatitis are:  Eruption on the chin, upper lip and eyelids   Sparing of the skin bordering the lips (which then appears pale), eyelids, nostrils  Clusters of 1-2 mm red bumps, potentially with pus  Dry and flaky skin surface  Burning  irritation    In contrast to steroid-induced rosacea, periorificial dermatitis spares the cheeks and forehead.  Genital periorificial dermatitis has a similar clinical appearance. It involves the skin on and around labia majora (in females), scrotum (in males) and anus.    Granulomatous periorificial dermatitis is a variant of periorificial dermatitis that presents with persistent yellowish bumps. It occurs mainly in young children and nearly always follows the use of a corticosteroid.   Rebound flare of severe periorificial dermatitis may occur after abrupt cessation of application of potent topical steroid to facial skin.  The presentation of periorificial dermatitis is usually typical, so diagnosis can be made by history and skin exam. There are no specific tests.  Skin biopsy may occasionally be taken, and show similar findings to rosacea.     Periorificial dermatitis responds well to treatment, although it may take several weeks before there is a noticeable improvement.    General measures  Discontinue applying all face creams including topical steroids, cosmetics and sunscreens (zero therapy).  Consider a slower withdrawal from topical steroid/face creams if there is a severe flare after steroid cessation. Temporarily, replace it by a less potent or less occlusive cream or apply it less and less frequently until it is no longer required.  Wash the face with warm water alone while the rash is present. When it has cleared up, use a non-soap bar or liquid cleanser if you wish.  Choose a liquid or gel sunscreen.    Topical therapy: used to treat mild periorificial dermatitis. Choices include:  Erythromycin  Clindamycin  Metronidazole  Pimecrolimus  Azelaic acid    Oral therapy: in more severe cases, a course of oral antibiotics may be prescribed for 6-12 weeks.  Most often, a tetracycline such as doxycycline is recommended. A sub-antimicrobial dose may be sufficient.  Oral erythromycin is used during pregnancy and  in pre-pubertal children.  Oral low-dose isotretinoin may be used if antibiotics are ineffective or contraindicated.    Periorificial dermatitis can generally be prevented by the avoidance of topical steroids and occlusive face creams. When topical steroids are necessary to treat an inflammatory facial rash, they should be applied accurately to the affected area, no more than once daily in the lowest effective potency, and discontinued as soon as the rash responds.   Periorificial dermatitis sometimes recurs when the antibiotics are discontinued, or at a later date. The same treatment can be used again.        Scribe Attestation    I,:  Belle Preciado MA am acting as a scribe while in the presence of the attending physician.:       I,:  Ana Cowan PA-C personally performed the services described in this documentation    as scribed in my presence.:

## 2025-01-23 ENCOUNTER — OFFICE VISIT (OUTPATIENT)
Dept: GYNECOLOGY | Facility: CLINIC | Age: 45
End: 2025-01-23
Payer: COMMERCIAL

## 2025-01-23 VITALS — DIASTOLIC BLOOD PRESSURE: 72 MMHG | WEIGHT: 142 LBS | BODY MASS INDEX: 22.41 KG/M2 | SYSTOLIC BLOOD PRESSURE: 114 MMHG

## 2025-01-23 DIAGNOSIS — N84.0 ENDOMETRIAL POLYP: ICD-10-CM

## 2025-01-23 DIAGNOSIS — N92.6 IRREGULAR MENSES: Primary | ICD-10-CM

## 2025-01-23 PROCEDURE — 99212 OFFICE O/P EST SF 10 MIN: CPT | Performed by: OBSTETRICS & GYNECOLOGY

## 2025-01-23 NOTE — PROGRESS NOTES
Assessment & Plan   Diagnoses and all orders for this visit:    Irregular menses    Endometrial polyp    1. irregular menses with presumed endometrial polyp-had to sonohysterogram 2024 with polyp seen.  Underwent hysteroscopy with D&C on 2025 with no polyp fragments noted.  Pathology demonstrated scant fragments of benign endometrium without atypia or malignancy.  Patient was shown the pathology report and intraoperative photos.  All questions were answered.  She has had no irregular bleeding this month.  She will call or return with any issues or recurrence of metrorrhagia.  2. prior history of uterine septum-was resected prior to having children.  No findings were noted at sonohysterogram nor hysteroscopy.  3. 7 mm fundal endometrium-noted at previous ultrasound/sonohysterogram.  No submucosal extension is appreciated.  4. breast-prior history of right breast discomfort has resolved.  Had bilateral diagnostic mammogram with right breast ultrasound from 10/21/2024 with 4 mm cyst noted at 4:00 2 cm from the nipple.  It was unchanged from study 2023.  She denies any symptoms.  Screening mammogram was recommended and request is in the chart.  5.  Prior history of ovarian cyst-none noted at prior ultrasound 2024  6. history of  delivery x 2  7.  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  Her 2 sisters are also patients here.  Appreciation was given.    Follow-up 2025 for yearly exam or as needed.      Subjective   Patient ID: Rhonda Carrero is a 45 y.o. female.    Vitals:    25 1303   BP: 114/72     Patient was seen today for follow-up visit.  Please see assessment plan for details.        The following portions of the patient's history were reviewed and updated as appropriate: allergies, current medications, past family history, past medical history, past social history, past surgical history, and problem list.  Past Medical History:   Diagnosis Date     Scoliosis     Small intestinal bacterial overgrowth (SIBO)      Past Surgical History:   Procedure Laterality Date     SECTION      x2    COLONOSCOPY      FOOT NEUROMA SURGERY Left     HYSTEROSCOPY      With resection of uterine septum    MO HYSTEROSCOPY REMOVAL LEIOMYOMATA N/A 2025    Procedure: D&C W/ HYSTEROSCOPY, EUA;  Surgeon: Chuckie Abdul MD;  Location: [facility];  Service: Gynecology     OB History    Para Term  AB Living   4 2 0 2 2 2   SAB IAB Ectopic Multiple Live Births   1  1  2      # Outcome Date GA Lbr Ernesto/2nd Weight Sex Type Anes PTL Lv   4 Ectopic 2016    F CS-Unspec   JAYLA      Birth Comments:  at 21 weeks, delivery at 37 weeks   3      M CS-Unspec   JAYLA   2 SAB 2000           1                 Current Outpatient Medications:     Ascorbic Acid (vitamin C) 100 MG tablet, Take 100 mg by mouth daily, Disp: , Rfl:     b complex vitamins capsule, Take 1 capsule by mouth daily, Disp: , Rfl:     Cyanocobalamin (VITAMIN B-12 PO), Take 1 tablet by mouth in the morning, Disp: , Rfl:     Digestive Enzymes (BETAINE HCL PO), Take by mouth 3 (three) times a day with meals, Disp: , Rfl:     doxycycline (ADOXA) 50 MG tablet, Take 1 tablet (50 mg total) by mouth 2 (two) times a day With a glass of water and something to eat., Disp: 60 tablet, Rfl: 0    IODINE, KELP, PO, Take 1 tablet by mouth daily, Disp: , Rfl:     metroNIDAZOLE (METROGEL) 1 % gel, Apply topically daily To face, Disp: 60 g, Rfl: 2    PREBIOTIC PRODUCT PO, Take 1 tablet by mouth daily, Disp: , Rfl:     Probiotic Product (PROBIOTIC PO), Take 1 tablet by mouth in the morning, Disp: , Rfl:     VITAMIN D PO, Take 1 tablet by mouth in the morning, Disp: , Rfl:   Allergies   Allergen Reactions    Molds & Smuts Fatigue     Social History     Socioeconomic History    Marital status: /Civil Union     Spouse name: None    Number of children: None    Years of education: None    Highest  education level: None   Occupational History    None   Tobacco Use    Smoking status: Former     Current packs/day: 0.25     Average packs/day: 0.3 packs/day for 5.0 years (1.3 ttl pk-yrs)     Types: Cigarettes    Smokeless tobacco: Never   Vaping Use    Vaping status: Never Used   Substance and Sexual Activity    Alcohol use: Yes     Comment: social    Drug use: Never    Sexual activity: Yes     Partners: Male   Other Topics Concern    None   Social History Narrative    None     Social Drivers of Health     Financial Resource Strain: Not on file   Food Insecurity: Not on file   Transportation Needs: Not on file   Physical Activity: Not on file   Stress: Not on file   Social Connections: Not on file   Intimate Partner Violence: Not on file   Housing Stability: Not on file     Family History   Problem Relation Age of Onset    Stroke Mother     Hypertension Mother     Heart disease Mother     Hyperlipidemia Father     Hypertension Father     No Known Problems Sister     No Known Problems Sister     No Known Problems Sister     No Known Problems Brother     Prostate cancer Maternal Grandfather         75-80    Gallbladder disease Paternal Grandmother         50's; cancer    No Known Problems Daughter     Ovarian cancer Paternal Aunt     Colon cancer Neg Hx     Colon polyps Neg Hx        Review of Systems   Constitutional:  Negative for chills, diaphoresis, fatigue and fever.   Respiratory:  Negative for apnea, cough, chest tightness, shortness of breath and wheezing.    Cardiovascular:  Negative for chest pain, palpitations and leg swelling.   Gastrointestinal:  Negative for abdominal distention, abdominal pain, anal bleeding, constipation, diarrhea, nausea, rectal pain and vomiting.   Genitourinary:  Negative for difficulty urinating, dyspareunia, dysuria, frequency, hematuria, menstrual problem, pelvic pain, urgency, vaginal bleeding, vaginal discharge and vaginal pain.   Musculoskeletal:  Negative for arthralgias,  back pain and myalgias.   Skin:  Negative for color change and rash.   Neurological:  Negative for dizziness, syncope, light-headedness, numbness and headaches.   Hematological:  Negative for adenopathy. Does not bruise/bleed easily.   Psychiatric/Behavioral:  Negative for dysphoric mood and sleep disturbance. The patient is not nervous/anxious.        Objective   Physical Exam  OBGyn Exam     Objective      /72 (BP Location: Left arm, Patient Position: Sitting)   Wt 64.4 kg (142 lb)   BMI 22.41 kg/m²     General:   alert and oriented, in no acute distress   Neck:    Breast:    Heart:    Lungs:    Abdomen: Nontender   Vulva:    Vagina:    Cervix:    Uterus:    Adnexa:    Rectum:     Psych:  Normal mood and affect   Skin:  Without obvious lesions   Eyes: symmetric, with normal movements and reactivity   Musculoskeletal:  Normal muscle tone and movements appreciated

## 2025-02-20 ENCOUNTER — OFFICE VISIT (OUTPATIENT)
Dept: DERMATOLOGY | Facility: CLINIC | Age: 45
End: 2025-02-20
Payer: COMMERCIAL

## 2025-02-20 DIAGNOSIS — A69.20 LYME DISEASE: ICD-10-CM

## 2025-02-20 DIAGNOSIS — L71.0 PERIORIFICIAL DERMATITIS: Primary | ICD-10-CM

## 2025-02-20 PROCEDURE — 99214 OFFICE O/P EST MOD 30 MIN: CPT

## 2025-02-20 RX ORDER — DOXYCYCLINE 100 MG/1
100 TABLET ORAL 2 TIMES DAILY
COMMUNITY

## 2025-02-20 RX ORDER — SULFAMETHOXAZOLE AND TRIMETHOPRIM 800; 160 MG/1; MG/1
TABLET ORAL
COMMUNITY
Start: 2025-02-16

## 2025-02-20 NOTE — PROGRESS NOTES
"[facility] Note     Patient Name: Rhonda Carrero  Encounter Date: 2/20/25     Have you been cared for by a [facility] Dermatologist in the last 3 years and, if so, which description applies to you?    Yes.  I have been here within the last 3 years, and my medical history has NOT changed since that time.  I am FEMALE/of child-bearing potential.    REVIEW OF SYSTEMS:  Have you recently had or currently have any of the following? No changes in my recent health.   PAST MEDICAL HISTORY:  Have you personally ever had or currently have any of the following?  If \"YES,\" then please provide more detail. No changes in my medical history.   HISTORY OF IMMUNOSUPPRESSION: Do you have a history of any of the following:  Systemic Immunosuppression such as Diabetes, Biologic or Immunotherapy, Chemotherapy, Organ Transplantation, Bone Marrow Transplantation or Prednisone?  No     Answering \"YES\" requires the addition of the dotphrase \"IMMUNOSUPPRESSED\" as the first diagnosis of the patient's visit.   FAMILY HISTORY:  Any \"first degree relatives\" (parent, brother, sister, or child) with the following?    No changes in my family's known health.   PATIENT EXPERIENCE:    Do you want the Dermatologist to perform a COMPLETE skin exam today including a clinical examination under the \"bra and underwear\" areas?  NO  If necessary, do we have your permission to call and leave a detailed message on your Preferred Phone number that includes your specific medical information?  Yes      Allergies   Allergen Reactions   • Molds & Smuts Fatigue      Current Outpatient Medications:   •  Ascorbic Acid (vitamin C) 100 MG tablet, Take 100 mg by mouth daily, Disp: , Rfl:   •  b complex vitamins capsule, Take 1 capsule by mouth daily, Disp: , Rfl:   •  Cyanocobalamin (VITAMIN B-12 PO), Take 1 tablet by mouth in the morning, Disp: , Rfl:   •  Digestive Enzymes (BETAINE HCL PO), Take by mouth 3 (three) times a day with meals, Disp: , " Rfl:   •  doxycycline (ADOXA) 100 MG tablet, Take 100 mg by mouth 2 (two) times a day, Disp: , Rfl:   •  IODINE, KELP, PO, Take 1 tablet by mouth daily, Disp: , Rfl:   •  metroNIDAZOLE (METROGEL) 1 % gel, Apply topically daily To face, Disp: 60 g, Rfl: 2  •  PREBIOTIC PRODUCT PO, Take 1 tablet by mouth daily, Disp: , Rfl:   •  Probiotic Product (PROBIOTIC PO), Take 1 tablet by mouth in the morning, Disp: , Rfl:   •  sulfamethoxazole-trimethoprim (BACTRIM DS) 800-160 mg per tablet, PLEASE SEE ATTACHED FOR DETAILED DIRECTIONS, Disp: , Rfl:   •  VITAMIN D PO, Take 1 tablet by mouth in the morning, Disp: , Rfl:           Whom besides the patient is providing clinical information about today's encounter?   NO ADDITIONAL HISTORIAN (patient alone provided history)    Physical Exam and Assessment/Plan by Diagnosis:    PERIORIFICIAL DERMATITIS     Physical Exam:  Anatomic Location Affected:   perinasal, perioral  Morphological Description:  mild residual erythema around nose and scattered papules periorally  Pertinent Positives:  Pertinent Negatives:     Additional History of Present Condition:  Patient reports the rash has improved with the topical metronidazole. Patient was undergoing testing for Lyme disease at last visit and was advised to hold off on starting doxycycline until Lyme testing came back. Lyme testing came back positive and patient started 100mg Doxycycline 100 mg and Bactrim 800-160 mg a week ago and will be on doxycycline for 1 month     Assessment and Plan:  Based on a thorough discussion of this condition and the management approach to it (including a comprehensive discussion of the known risks, side effects and potential benefits of treatment), the patient (family) agrees to implement the following specific plan:  Finish course of antibiotics give by your PCP-doxycycline 100mg daily will help with residual perioral dermatitis  Continue use of Metronidazole 1% gel daily to affected areas  Follow up 2  months      What is periorificial dermatitis?  Periorificial dermatitis is a common facial skin problem characterized by groups of itchy or tender small red bumps. It is given this name because the bumps occur around the eyes, the nostrils, the mouth and occasionally, the genitals.  The more restrictive term, perioral dermatitis, is often used when the eruption is confined to the skin in the lower half of the face, particularly around the mouth. Periocular dermatitis may be used to describe the rash affecting the eyelids.  Periorificial dermatitis mainly affects adult women aged 15 to 45 years. It is less common in men. It can also affect children of any age.     What is the cause of periorificial or periorificial dermatitis?  The exact cause of periorificial is not understood. Periorificial dermatitis may be related to:  Skin barrier dysfunction  Activation of the body's immune system  Altered bacterial levels on the skin  Bacteria from hair follicles     Unlike seborrheic dermatitis which can affect similar areas of the face, malassezia yeasts are not involved in periorificial dermatitis.  Periorificial dermatitis may be directly caused by:  Topical steroids, whether applied deliberately to facial skin or inadvertently  Nasal steroids, steroid inhalers, and oral steroids  Cosmetic creams, make-ups and sunscreens  Fluorinated toothpaste  Neglecting to wash the face  Hormonal changes and/or oral contraceptives     What are the symptoms of periorificial dermatitis?  The characteristics of facial periorificial dermatitis are:  Eruption on the chin, upper lip and eyelids   Sparing of the skin bordering the lips (which then appears pale), eyelids, nostrils  Clusters of 1-2 mm red bumps, potentially with pus  Dry and flaky skin surface  Burning irritation     In contrast to steroid-induced rosacea, periorificial dermatitis spares the cheeks and forehead.  Genital periorificial dermatitis has a similar clinical  appearance. It involves the skin on and around labia majora (in females), scrotum (in males) and anus.     Granulomatous periorificial dermatitis is a variant of periorificial dermatitis that presents with persistent yellowish bumps. It occurs mainly in young children and nearly always follows the use of a corticosteroid.   Rebound flare of severe periorificial dermatitis may occur after abrupt cessation of application of potent topical steroid to facial skin.  The presentation of periorificial dermatitis is usually typical, so diagnosis can be made by history and skin exam. There are no specific tests.  Skin biopsy may occasionally be taken, and show similar findings to rosacea.      Periorificial dermatitis responds well to treatment, although it may take several weeks before there is a noticeable improvement.     General measures  Discontinue applying all face creams including topical steroids, cosmetics and sunscreens (zero therapy).  Consider a slower withdrawal from topical steroid/face creams if there is a severe flare after steroid cessation. Temporarily, replace it by a less potent or less occlusive cream or apply it less and less frequently until it is no longer required.  Wash the face with warm water alone while the rash is present. When it has cleared up, use a non-soap bar or liquid cleanser if you wish.  Choose a liquid or gel sunscreen.     Topical therapy: used to treat mild periorificial dermatitis. Choices include:  Erythromycin  Clindamycin  Metronidazole  Pimecrolimus  Azelaic acid     Oral therapy: in more severe cases, a course of oral antibiotics may be prescribed for 6-12 weeks.  Most often, a tetracycline such as doxycycline is recommended. A sub-antimicrobial dose may be sufficient.  Oral erythromycin is used during pregnancy and in pre-pubertal children.  Oral low-dose isotretinoin may be used if antibiotics are ineffective or contraindicated.     Periorificial dermatitis can generally be  prevented by the avoidance of topical steroids and occlusive face creams. When topical steroids are necessary to treat an inflammatory facial rash, they should be applied accurately to the affected area, no more than once daily in the lowest effective potency, and discontinued as soon as the rash responds.   Periorificial dermatitis sometimes recurs when the antibiotics are discontinued, or at a later date. The same treatment can be used again.    Scribe Attestation    I,:  Abdirahman Brewster am acting as a scribe while in the presence of the attending physician.:       I,:  Ana Cowan PA-C personally performed the services described in this documentation    as scribed in my presence.:

## 2025-04-24 ENCOUNTER — TELEPHONE (OUTPATIENT)
Age: 45
End: 2025-04-24

## 2025-04-24 NOTE — TELEPHONE ENCOUNTER
Received call from Patient who had tried to cancel appt via text message but it unfortunately was not cancelled. Cancelled 4/25/25 10:20 am [facility] appt per Patient's request.     Patient verbalized understanding.

## 2025-06-25 ENCOUNTER — APPOINTMENT (EMERGENCY)
Dept: NON INVASIVE DIAGNOSTICS | Facility: HOSPITAL | Age: 45
End: 2025-06-25
Payer: COMMERCIAL

## 2025-06-25 ENCOUNTER — HOSPITAL ENCOUNTER (EMERGENCY)
Facility: HOSPITAL | Age: 45
Discharge: HOME | End: 2025-06-25
Attending: EMERGENCY MEDICINE | Admitting: EMERGENCY MEDICINE
Payer: COMMERCIAL

## 2025-06-25 ENCOUNTER — APPOINTMENT (OUTPATIENT)
Dept: RADIOLOGY | Facility: HOSPITAL | Age: 45
End: 2025-06-25
Payer: COMMERCIAL

## 2025-06-25 VITALS
DIASTOLIC BLOOD PRESSURE: 73 MMHG | HEART RATE: 59 BPM | OXYGEN SATURATION: 99 % | TEMPERATURE: 98.5 F | SYSTOLIC BLOOD PRESSURE: 123 MMHG | RESPIRATION RATE: 16 BRPM

## 2025-06-25 DIAGNOSIS — R07.9 CHEST PAIN: Primary | ICD-10-CM

## 2025-06-25 DIAGNOSIS — M79.604 RIGHT LEG PAIN: ICD-10-CM

## 2025-06-25 LAB
ALBUMIN SERPL BCG-MCNC: 4.5 G/DL (ref 3.5–5)
ALP SERPL-CCNC: 51 U/L (ref 34–104)
ALT SERPL W P-5'-P-CCNC: 16 U/L (ref 7–52)
ANION GAP SERPL CALCULATED.3IONS-SCNC: 9 MMOL/L (ref 4–13)
AST SERPL W P-5'-P-CCNC: 21 U/L (ref 13–39)
BASOPHILS # BLD AUTO: 0.05 THOUSANDS/ÂΜL (ref 0–0.1)
BASOPHILS NFR BLD AUTO: 1 % (ref 0–1)
BILIRUB SERPL-MCNC: 0.59 MG/DL (ref 0.2–1)
BUN SERPL-MCNC: 16 MG/DL (ref 5–25)
CALCIUM SERPL-MCNC: 9.3 MG/DL (ref 8.4–10.2)
CARDIAC TROPONIN I PNL SERPL HS: <2 NG/L (ref ?–50)
CHLORIDE SERPL-SCNC: 106 MMOL/L (ref 96–108)
CO2 SERPL-SCNC: 23 MMOL/L (ref 21–32)
CREAT SERPL-MCNC: 0.71 MG/DL (ref 0.6–1.3)
D DIMER PPP FEU-MCNC: <0.27 UG/ML FEU
EOSINOPHIL # BLD AUTO: 0.12 THOUSAND/ÂΜL (ref 0–0.61)
EOSINOPHIL NFR BLD AUTO: 1 % (ref 0–6)
ERYTHROCYTE [DISTWIDTH] IN BLOOD BY AUTOMATED COUNT: 12.9 % (ref 11.6–15.1)
GFR SERPL CREATININE-BSD FRML MDRD: 103 ML/MIN/1.73SQ M
GLUCOSE SERPL-MCNC: 111 MG/DL (ref 65–140)
HCT VFR BLD AUTO: 39.3 % (ref 34.8–46.1)
HGB BLD-MCNC: 13.2 G/DL (ref 11.5–15.4)
IMM GRANULOCYTES # BLD AUTO: 0.04 THOUSAND/UL (ref 0–0.2)
IMM GRANULOCYTES NFR BLD AUTO: 1 % (ref 0–2)
LYMPHOCYTES # BLD AUTO: 2.3 THOUSANDS/ÂΜL (ref 0.6–4.47)
LYMPHOCYTES NFR BLD AUTO: 28 % (ref 14–44)
MCH RBC QN AUTO: 31.7 PG (ref 26.8–34.3)
MCHC RBC AUTO-ENTMCNC: 33.6 G/DL (ref 31.4–37.4)
MCV RBC AUTO: 95 FL (ref 82–98)
MONOCYTES # BLD AUTO: 0.75 THOUSAND/ÂΜL (ref 0.17–1.22)
MONOCYTES NFR BLD AUTO: 9 % (ref 4–12)
NEUTROPHILS # BLD AUTO: 5.03 THOUSANDS/ÂΜL (ref 1.85–7.62)
NEUTS SEG NFR BLD AUTO: 60 % (ref 43–75)
NRBC BLD AUTO-RTO: 0 /100 WBCS
PLATELET # BLD AUTO: 227 THOUSANDS/UL (ref 149–390)
PMV BLD AUTO: 10.2 FL (ref 8.9–12.7)
POTASSIUM SERPL-SCNC: 3.4 MMOL/L (ref 3.5–5.3)
PROT SERPL-MCNC: 7 G/DL (ref 6.4–8.4)
RBC # BLD AUTO: 4.16 MILLION/UL (ref 3.81–5.12)
SODIUM SERPL-SCNC: 138 MMOL/L (ref 135–147)
WBC # BLD AUTO: 8.29 THOUSAND/UL (ref 4.31–10.16)

## 2025-06-25 PROCEDURE — 36415 COLL VENOUS BLD VENIPUNCTURE: CPT

## 2025-06-25 PROCEDURE — 85025 COMPLETE CBC W/AUTO DIFF WBC: CPT

## 2025-06-25 PROCEDURE — 80053 COMPREHEN METABOLIC PANEL: CPT

## 2025-06-25 PROCEDURE — 99284 EMERGENCY DEPT VISIT MOD MDM: CPT

## 2025-06-25 PROCEDURE — 93971 EXTREMITY STUDY: CPT

## 2025-06-25 PROCEDURE — 71046 X-RAY EXAM CHEST 2 VIEWS: CPT

## 2025-06-25 PROCEDURE — 99285 EMERGENCY DEPT VISIT HI MDM: CPT | Performed by: EMERGENCY MEDICINE

## 2025-06-25 PROCEDURE — 93005 ELECTROCARDIOGRAM TRACING: CPT

## 2025-06-25 PROCEDURE — 85379 FIBRIN DEGRADATION QUANT: CPT | Performed by: EMERGENCY MEDICINE

## 2025-06-25 PROCEDURE — 84484 ASSAY OF TROPONIN QUANT: CPT

## 2025-06-25 NOTE — ED PROVIDER NOTES
Time reflects when diagnosis was documented in both MDM as applicable and the Disposition within this note       Time User Action Codes Description Comment    6/25/2025  8:16 PM Charisse Bearden Add [R07.9] Chest pain     6/25/2025  8:16 PM Charisse Bearden Add [M79.604] Right leg pain           ED Disposition       ED Disposition   Discharge    Condition   Stable    Date/Time   Wed Jun 25, 2025  8:16 PM    Comment   Rhonda Carrero discharge to home/self care.                   Assessment & Plan       Medical Decision Making  45 year old female presents for evaluation of right leg pain for 3 weeks beginning after traveling to [place] as well as chest pain and shortness of breath which began today.  EKG nonspecific, but with no STEMI criteria or arrhythmia on my independent interpretation.  HEART score 2.  Duplex negative for DVT.  Low risk Wells score for PE.  D-dimer negative making VTE unlikely.  PCP follow up.    Amount and/or Complexity of Data Reviewed  Labs: ordered.  Radiology: independent interpretation performed.        ED Course as of 06/25/25 2017 Wed Jun 25, 2025 1917 Duplex negative for DVT       Medications - No data to display    ED Risk Strat Scores   HEART Risk Score      Flowsheet Row Most Recent Value   Heart Score Risk Calculator    History 0 Filed at: 06/25/2025 1922   ECG 1 Filed at: 06/25/2025 1922   Age 0 Filed at: 06/25/2025 1922   Risk Factors 1 Filed at: 06/25/2025 1922   Troponin 0 Filed at: 06/25/2025 1922   HEART Score 2 Filed at: 06/25/2025 1922          HEART Risk Score      Flowsheet Row Most Recent Value   Heart Score Risk Calculator    History 0 Filed at: 06/25/2025 1922   ECG 1 Filed at: 06/25/2025 1922   Age 0 Filed at: 06/25/2025 1922   Risk Factors 1 Filed at: 06/25/2025 1922   Troponin 0 Filed at: 06/25/2025 1922   HEART Score 2 Filed at: 06/25/2025 1922                      No data recorded            Wells' Criteria for PE      Flowsheet Row Most Recent Value    Wells' Criteria for PE    Clinical signs and symptoms of DVT 0 Filed at: 06/25/2025 1922   PE is primary diagnosis or equally likely 0 Filed at: 06/25/2025 1922   HR >100 0 Filed at: 06/25/2025 1922   Immobilization at least 3 days or Surgery in the previous 4 weeks 0 Filed at: 06/25/2025 1922   Previous, objectively diagnosed PE or DVT 0 Filed at: 06/25/2025 1922   Hemoptysis 0 Filed at: 06/25/2025 1922   Malignancy with treatment within 6 months or palliative 0 Filed at: 06/25/2025 1922   Wells' Criteria Total 0 Filed at: 06/25/2025 1922                        History of Present Illness       Chief Complaint   Patient presents with    Leg Pain     Leg pain 3 weeks ago. Tenderness/swelling behind the knee. 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 SOB started today when pt takes a deep breath. Denies any chest discomfort. Pt recently traveled from [place]       Past Medical History[1]   Past Surgical History[2]   Family History[3]   Social History[4]   E-Cigarette/Vaping    E-Cigarette Use Never User       E-Cigarette/Vaping Substances    Nicotine No     THC No     CBD No     Flavoring No       I have reviewed and agree with the history as documented.     45 year old female presents for evaluation of right leg pain for the past 3 weeks which has been worsening over the past couple of days.  Patient began having chest pain and shortness of breath today.  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  She denies any significant swelling of the extremity.  No history of VTE in the past.  She had traveled to [place] 1 month ago.      Leg Pain      Review of Systems        Objective       ED Triage Vitals   Temperature Pulse Blood Pressure Respirations SpO2 Patient Position - Orthostatic VS   06/25/25 1810 06/25/25 1807 06/25/25 1807 06/25/25 1807 06/25/25 1807 --   98.5 °F (36.9 °C) 70 132/69 18 99 %       Temp  Source Heart Rate Source BP Location FiO2 (%) Pain Score    06/25/25 1810 06/25/25 1807 -- -- --    Oral Monitor         Vitals      Date and Time Temp Pulse SpO2 Resp BP Pain Score FACES Pain Rating User   06/25/25 2000 -- 59 99 % 16 -- -- --    06/25/25 1930 -- 61 98 % 18 123/73 -- --    06/25/25 1810 98.5 °F (36.9 °C) -- -- -- -- -- --    06/25/25 1807 -- 70 99 % 18 132/69 -- --             Physical Exam  Vitals and nursing note reviewed.     Cardiovascular:      Rate and Rhythm: Normal rate and regular rhythm.      Pulses: Normal pulses.   Pulmonary:      Effort: Pulmonary effort is normal. No respiratory distress.   Abdominal:      General: There is no distension.      Palpations: Abdomen is soft.      Tenderness: There is no abdominal tenderness.     Musculoskeletal:      Right lower leg: No edema.      Left lower leg: No edema.     Skin:     General: Skin is warm and dry.     Neurological:      Mental Status: She is alert.         Results Reviewed       Procedure Component Value Units Date/Time    D-Dimer [[id number]]  (Normal) Collected: 06/25/25 1947    Lab Status: Final result Specimen: Blood from Arm, Right Updated: 06/25/25 2016     D-Dimer, Quant <0.27 ug/ml FEU     HS Troponin I 4hr [[id number]]     Lab Status: No result Specimen: Blood     HS Troponin 0hr (reflex protocol) [[id number]]  (Normal) Collected: 06/25/25 1812    Lab Status: Final result Specimen: Blood from Arm, Right Updated: 06/25/25 1843     hs TnI 0hr <2 ng/L     HS Troponin I 2hr [[id number]]     Lab Status: No result Specimen: Blood     Comprehensive metabolic panel [[id number]]  (Abnormal) Collected: 06/25/25 1812    Lab Status: Final result Specimen: Blood from Arm, Right Updated: 06/25/25 1836     Sodium 138 mmol/L      Potassium 3.4 mmol/L      Chloride 106 mmol/L      CO2 23 mmol/L      ANION GAP 9 mmol/L      BUN 16 mg/dL      Creatinine 0.71 mg/dL      Glucose 111 mg/dL      Calcium 9.3 mg/dL      AST 21 U/L      ALT 16 U/L       Alkaline Phosphatase 51 U/L      Total Protein 7.0 g/dL      Albumin 4.5 g/dL      Total Bilirubin 0.59 mg/dL      eGFR 103 ml/min/1.73sq m     Narrative:      National Kidney Disease Foundation guidelines for Chronic Kidney Disease (CKD):     Stage 1 with normal or high GFR (GFR > 90 mL/min/1.73 square meters)    Stage 2 Mild CKD (GFR = 60-89 mL/min/1.73 square meters)    Stage 3A Moderate CKD (GFR = 45-59 mL/min/1.73 square meters)    Stage 3B Moderate CKD (GFR = 30-44 mL/min/1.73 square meters)    Stage 4 Severe CKD (GFR = 15-29 mL/min/1.73 square meters)    Stage 5 End Stage CKD (GFR <15 mL/min/1.73 square meters)  Note: GFR calculation is accurate only with a steady state creatinine    CBC and differential [[id number]] Collected: 06/25/25 1812    Lab Status: Final result Specimen: Blood from Arm, Right Updated: 06/25/25 1821     WBC 8.29 Thousand/uL      RBC 4.16 Million/uL      Hemoglobin 13.2 g/dL      Hematocrit 39.3 %      MCV 95 fL      MCH 31.7 pg      MCHC 33.6 g/dL      RDW 12.9 %      MPV 10.2 fL      Platelets 227 Thousands/uL      nRBC 0 /100 WBCs      Segmented % 60 %      Immature Grans % 1 %      Lymphocytes % 28 %      Monocytes % 9 %      Eosinophils Relative 1 %      Basophils Relative 1 %      Absolute Neutrophils 5.03 Thousands/µL      Absolute Immature Grans 0.04 Thousand/uL      Absolute Lymphocytes 2.30 Thousands/µL      Absolute Monocytes 0.75 Thousand/µL      Eosinophils Absolute 0.12 Thousand/µL      Basophils Absolute 0.05 Thousands/µL             XR chest 2 views   ED Interpretation by Charisse Bearden MD (06/25 1924)   No acute pulmonary pathology      VAS VENOUS DUPLEX -LOWER LIMB UNILATERAL    (Results Pending)       ECG 12 Lead Documentation Only    Date/Time: 6/25/2025 7:20 PM    Performed by: Charisse Bearden MD  Authorized by: Charisse Bearden MD    Indications / Diagnosis:  Chest pain  ECG reviewed by me, the ED Provider: yes    Patient location:   ED  Previous ECG:     Previous ECG:  Unavailable  Interpretation:     Interpretation: abnormal    Rate:     ECG rate:  66    ECG rate assessment: normal    Rhythm:     Rhythm: sinus rhythm    Ectopy:     Ectopy: none    QRS:     QRS axis:  Right    QRS intervals:  Normal  Conduction:     Conduction: normal    ST segments:     ST segments:  Non-specific    Depression:  AVF, V4 and V5  T waves:     T waves: normal        ED Medication and Procedure Management   Prior to Admission Medications   Prescriptions Last Dose Informant Patient Reported? Taking?   Ascorbic Acid (vitamin C) 100 MG tablet   Yes No   Sig: Take 100 mg by mouth daily   Cyanocobalamin (VITAMIN B-12 PO)  Self Yes No   Sig: Take 1 tablet by mouth in the morning   Digestive Enzymes (BETAINE HCL PO)  Self Yes No   Sig: Take by mouth 3 (three) times a day with meals   IODINE, KELP, PO  Self Yes No   Sig: Take 1 tablet by mouth daily   PREBIOTIC PRODUCT PO  Self Yes No   Sig: Take 1 tablet by mouth daily   Probiotic Product (PROBIOTIC PO)  Self Yes No   Sig: Take 1 tablet by mouth in the morning   VITAMIN D PO  Self Yes No   Sig: Take 1 tablet by mouth in the morning   b complex vitamins capsule   Yes No   Sig: Take 1 capsule by mouth daily   doxycycline (ADOXA) 100 MG tablet   Yes No   Sig: Take 100 mg by mouth 2 (two) times a day   metroNIDAZOLE (METROGEL) 1 % gel   No No   Sig: Apply topically daily To face   sulfamethoxazole-trimethoprim (BACTRIM DS) 800-160 mg per tablet   Yes No   Sig: PLEASE SEE ATTACHED FOR DETAILED DIRECTIONS      Facility-Administered Medications: None     Patient's Medications   Discharge Prescriptions    No medications on file     No discharge procedures on file.  ED SEPSIS DOCUMENTATION   Time reflects when diagnosis was documented in both MDM as applicable and the Disposition within this note       Time User Action Codes Description Comment    6/25/2025  8:16 PM Charisse Bearden [R07.9] Chest pain     6/25/2025  8:16 PM  Charisse Bearden Add [M79.604] Right leg pain                      [1]   Past Medical History:  Diagnosis Date    Allergic     Scoliosis     Small intestinal bacterial overgrowth (SIBO)    [2]   Past Surgical History:  Procedure Laterality Date     SECTION      x2    COLONOSCOPY      FOOT NEUROMA SURGERY Left     HYSTEROSCOPY  2012    With resection of uterine septum    [place] HYSTEROSCOPY REMOVAL LEIOMYOMATA N/A 2025    Procedure: D&C W/ HYSTEROSCOPY, EUA;  Surgeon: Chuckie Abdul MD;  Location: [facility];  Service: Gynecology    SKIN BIOPSY  7 years ago    Neg   [3]   Family History  Problem Relation Name Age of Onset    Stroke Mother Debbie Rodríguez     Hypertension Mother Debbie Rodríguez     Heart disease Mother Debbie Rodríguez     Hyperlipidemia Father Bill     Hypertension Father Bill     No Known Problems Sister      No Known Problems Sister      No Known Problems Sister      No Known Problems Brother      Prostate cancer Maternal Grandfather Eddeshawn         75-80    Gallbladder disease Paternal Grandmother          50's; cancer    No Known Problems Daughter      Ovarian cancer Paternal Aunt      Colon cancer Neg Hx      Colon polyps Neg Hx     [4]   Social History  Tobacco Use    Smoking status: Former     Current packs/day: 0.25     Average packs/day: 0.3 packs/day for 5.0 years (1.3 ttl pk-yrs)     Types: Cigarettes    Smokeless tobacco: Never   Vaping Use    Vaping status: Never Used   Substance Use Topics    Alcohol use: Yes     Comment: social    Drug use: Never        Charisse Bearden MD  25

## 2025-06-26 LAB
ATRIAL RATE: 66 BPM
P AXIS: 80 DEGREES
PR INTERVAL: 148 MS
QRS AXIS: 92 DEGREES
QRSD INTERVAL: 88 MS
QT INTERVAL: 432 MS
QTC INTERVAL: 453 MS
T WAVE AXIS: 67 DEGREES
VENTRICULAR RATE: 66 BPM

## 2025-06-26 PROCEDURE — 93971 EXTREMITY STUDY: CPT | Performed by: SURGERY

## 2025-06-26 PROCEDURE — 93010 ELECTROCARDIOGRAM REPORT: CPT | Performed by: INTERNAL MEDICINE

## 2025-07-10 ENCOUNTER — OFFICE VISIT (OUTPATIENT)
Dept: DERMATOLOGY | Facility: CLINIC | Age: 45
End: 2025-07-10
Payer: COMMERCIAL

## 2025-07-10 DIAGNOSIS — D22.62 MULTIPLE BENIGN MELANOCYTIC NEVI OF BOTH UPPER EXTREMITIES, BOTH LOWER EXTREMITIES, AND TRUNK: ICD-10-CM

## 2025-07-10 DIAGNOSIS — L81.4 SOLAR LENTIGO: ICD-10-CM

## 2025-07-10 DIAGNOSIS — D18.01 CHERRY ANGIOMA: ICD-10-CM

## 2025-07-10 DIAGNOSIS — D22.71 MULTIPLE BENIGN MELANOCYTIC NEVI OF BOTH UPPER EXTREMITIES, BOTH LOWER EXTREMITIES, AND TRUNK: ICD-10-CM

## 2025-07-10 DIAGNOSIS — D22.72 MULTIPLE BENIGN MELANOCYTIC NEVI OF BOTH UPPER EXTREMITIES, BOTH LOWER EXTREMITIES, AND TRUNK: ICD-10-CM

## 2025-07-10 DIAGNOSIS — D22.5 MULTIPLE BENIGN MELANOCYTIC NEVI OF BOTH UPPER EXTREMITIES, BOTH LOWER EXTREMITIES, AND TRUNK: ICD-10-CM

## 2025-07-10 DIAGNOSIS — D22.61 MULTIPLE BENIGN MELANOCYTIC NEVI OF BOTH UPPER EXTREMITIES, BOTH LOWER EXTREMITIES, AND TRUNK: ICD-10-CM

## 2025-07-10 DIAGNOSIS — Z12.83 SKIN EXAM, SCREENING FOR CANCER: Primary | ICD-10-CM

## 2025-07-10 PROCEDURE — 99213 OFFICE O/P EST LOW 20 MIN: CPT

## 2025-07-10 NOTE — PROGRESS NOTES
"[facility] Note     Patient Name: Rhonda Carrero  Encounter Date: 7/10/2025     Have you been cared for by a [facility] Dermatologist in the last 3 years and, if so, which description applies to you? Yes. I have been here within the last 3 years, and my medical history has NOT changed since that time. I am of child-bearing potential.     REVIEW OF SYSTEMS:  Have you recently had or currently have any of the following? No changes in my recent health.   PAST MEDICAL HISTORY:  Have you personally ever had or currently have any of the following?  If \"YES,\" then please provide more detail. No changes in my medical history.   HISTORY OF IMMUNOSUPPRESSION: Do you have a history of any of the following:  Systemic Immunosuppression such as Diabetes, Biologic or Immunotherapy, Chemotherapy, Organ Transplantation, Bone Marrow Transplantation or Prednisone?  No     Answering \"YES\" requires the addition of the dotphrase \"IMMUNOSUPPRESSED\" as the first diagnosis of the patient's visit.   FAMILY HISTORY:  Any \"first degree relatives\" (parent, brother, sister, or child) with the following?    No changes in my family's known health.   PATIENT EXPERIENCE:    Do you want the Dermatologist to perform a COMPLETE skin exam today including a clinical examination under the \"bra and underwear\" areas?  Yes  If necessary, do we have your permission to call and leave a detailed message on your Preferred Phone number that includes your specific medical information?  Yes      Allergies[1] Current Medications[2]        Whom besides the patient is providing clinical information about today's encounter?   NO ADDITIONAL HISTORIAN (patient alone provided history)    Physical Exam and Assessment/Plan by Diagnosis:    MELANOCYTIC NEVI  -Relevant exam: Scattered over the trunk/extremities are homogenously pigmented brown macules and papules. ELM performed and without concerning findings. No outliers unless otherwise noted in today's " note  - Exam and clinical history consistent with melanocytic nevi  - Counseled to return to clinic prior to scheduled appointment should any of these lesions change or should any new lesions of concern arise  - Counseled on use of sun protection daily. Reviewed latest FDA sunscreen guidelines, including use of broad spectrum (UVA and UVB blocking) sunscreen or sun protective clothing with SPF 30-50 every 2-3 hours and reapplied after exposure to water  - Will continue to monitor left webspace between 3rd and 4th toes. New photo taken today.Patient has hx of RSD after foot surgery 2001 and was advised not to biopsy unless necessary due to concern for RSD recurrence. Patient reports it has been present for years without change-has always had darker central portion.   -Patient takes photos at home for comparison and checks site regularly for changes      LENTIGINES  OTHER SKIN CHANGES DUE TO CHRONIC EXPOSURE TO NONIONIZING RADIATION  - Relevant exam: Over sun exposed areas are brown macules. ELM performed and without concerning findings.  - Exam and clinical history consistent with lentigines.  - Counseled to return to clinic prior to scheduled appointment should any of these lesions change or should any new lesions of concern arise.  - Recommended use of sunscreen as above and below.    CHERRY ANGIOMAS  - Relevant exam: Scattered over the trunk/extremities are red papules  - Exam and clinical history consistent with cherry angiomas  - Educated that these are benign    Scribe Attestation    I,:  Belle Preciado MA am acting as a scribe while in the presence of the attending physician.:       I,:  Ana Cowan PA-C personally performed the services described in this documentation    as scribed in my presence.:                  [1]  Allergies  Allergen Reactions   • Molds & Smuts Fatigue   [2]    Current Outpatient Medications:   •  Ascorbic Acid (vitamin C) 100 MG tablet, Take 100 mg by mouth in the morning., Disp: ,  Rfl:   •  b complex vitamins capsule, Take 1 capsule by mouth in the morning., Disp: , Rfl:   •  Cyanocobalamin (VITAMIN B-12 PO), Take 1 tablet by mouth in the morning, Disp: , Rfl:   •  Digestive Enzymes (BETAINE HCL PO), Take by mouth in the morning and at noon and in the evening. Take with meals., Disp: , Rfl:   •  doxycycline (ADOXA) 100 MG tablet, Take 100 mg by mouth in the morning and 100 mg in the evening., Disp: , Rfl:   •  IODINE, KELP, PO, Take 1 tablet by mouth in the morning., Disp: , Rfl:   •  metroNIDAZOLE (METROGEL) 1 % gel, Apply topically daily To face, Disp: 60 g, Rfl: 2  •  PREBIOTIC PRODUCT PO, Take 1 tablet by mouth in the morning., Disp: , Rfl:   •  Probiotic Product (PROBIOTIC PO), Take 1 tablet by mouth in the morning, Disp: , Rfl:   •  sulfamethoxazole-trimethoprim (BACTRIM DS) 800-160 mg per tablet, , Disp: , Rfl:   •  VITAMIN D PO, Take 1 tablet by mouth in the morning, Disp: , Rfl:

## 2025-07-10 NOTE — PATIENT INSTRUCTIONS
SEBORRHEIC KERATOSES  - Relevant exam: Scattered over the trunk/extremities are waxy brown to black plaques and papules with stuck on appearance and consistent dermoscopy  - Exam and clinical history consistent with seborrheic keratoses  - Counseled that these are benign growths that do not require treatment     MELANOCYTIC NEVI  -Relevant exam: Scattered over the trunk/extremities are homogenously pigmented brown macules and papules. ELM performed and without concerning findings. No outliers unless otherwise noted in today's note  - Exam and clinical history consistent with melanocytic nevi  - Counseled to return to clinic prior to scheduled appointment should any of these lesions change or should any new lesions of concern arise  - Counseled on use of sun protection daily. Reviewed latest FDA sunscreen guidelines, including use of broad spectrum (UVA and UVB blocking) sunscreen or sun protective clothing with SPF 30-50 every 2-3 hours and reapplied after exposure to water  - will continue to monitor left webspace between 3rd and 4th toes. New photo taken today.Patient has RSD and her doctor would prefer no treatment unless absolutely necessary.       LENTIGINES  OTHER SKIN CHANGES DUE TO CHRONIC EXPOSURE TO NONIONIZING RADIATION  - Relevant exam: Over sun exposed areas are brown macules. ELM performed and without concerning findings.  - Exam and clinical history consistent with lentigines.  - Counseled to return to clinic prior to scheduled appointment should any of these lesions change or should any new lesions of concern arise.  - Recommended use of sunscreen as above and below.     CHERRY ANGIOMAS  - Relevant exam: Scattered over the trunk/extremities are red papules  - Exam and clinical history consistent with cherry angiomas  - Educated that these are benign

## (undated) DEVICE — SCD SEQUENTIAL COMPRESSION COMFORT SLEEVE MEDIUM KNEE LENGTH: Brand: KENDALL SCD

## (undated) DEVICE — GLOVE INDICATOR PI UNDERGLOVE SZ 8 BLUE

## (undated) DEVICE — 2000CC GUARDIAN II: Brand: GUARDIAN

## (undated) DEVICE — GLOVE PI ULTRA TOUCH SZ.8.0

## (undated) DEVICE — UNDER BUTTOCKS DRAPE W/FLUID CONTROL POUCH: Brand: CONVERTORS

## (undated) DEVICE — STRL ALLENTOWN HYSTEROSCOPY PK: Brand: CARDINAL HEALTH

## (undated) DEVICE — PREMIUM DRY TRAY LF: Brand: MEDLINE INDUSTRIES, INC.

## (undated) DEVICE — IV SET EXT 30 IN

## (undated) DEVICE — SURGICAL GOWN, XL SMARTSLEEVE: Brand: CONVERTORS

## (undated) DEVICE — CYSTO TUBING SINGLE IRRIGATION

## (undated) DEVICE — PVC URETHRAL CATHETER: Brand: DOVER